# Patient Record
Sex: FEMALE | Race: WHITE | Employment: UNEMPLOYED | URBAN - METROPOLITAN AREA
[De-identification: names, ages, dates, MRNs, and addresses within clinical notes are randomized per-mention and may not be internally consistent; named-entity substitution may affect disease eponyms.]

---

## 2017-05-23 ENCOUNTER — OFFICE VISIT (OUTPATIENT)
Dept: FAMILY MEDICINE CLINIC | Age: 22
End: 2017-05-23

## 2017-05-23 VITALS
DIASTOLIC BLOOD PRESSURE: 79 MMHG | HEIGHT: 66 IN | BODY MASS INDEX: 17.52 KG/M2 | WEIGHT: 109 LBS | TEMPERATURE: 97.5 F | OXYGEN SATURATION: 96 % | SYSTOLIC BLOOD PRESSURE: 110 MMHG | RESPIRATION RATE: 18 BRPM | HEART RATE: 82 BPM

## 2017-05-23 DIAGNOSIS — Z01.419 WELL WOMAN EXAM: ICD-10-CM

## 2017-05-23 DIAGNOSIS — Z00.00 WELL WOMAN EXAM (NO GYNECOLOGICAL EXAM): Primary | ICD-10-CM

## 2017-05-23 PROBLEM — N92.0 MENORRHAGIA: Status: ACTIVE | Noted: 2017-05-23

## 2017-05-23 LAB
BILIRUB UR QL STRIP: NEGATIVE
GLUCOSE UR-MCNC: NEGATIVE MG/DL
HGB BLD-MCNC: 13.1 G/DL
KETONES P FAST UR STRIP-MCNC: NEGATIVE MG/DL
PH UR STRIP: 5.5 [PH] (ref 4.6–8)
PROT UR QL STRIP: NEGATIVE MG/DL
SP GR UR STRIP: 1.02 (ref 1–1.03)
UA UROBILINOGEN AMB POC: NORMAL (ref 0.2–1)
URINALYSIS CLARITY POC: NORMAL
URINALYSIS COLOR POC: YELLOW
URINE BLOOD POC: NEGATIVE
URINE LEUKOCYTES POC: NORMAL
URINE NITRITES POC: NEGATIVE

## 2017-05-23 RX ORDER — NORETHINDRONE ACETATE AND ETHINYL ESTRADIOL 1.5-30(21)
1 KIT ORAL DAILY
COMMUNITY
End: 2020-02-21 | Stop reason: ALTCHOICE

## 2017-05-23 NOTE — MR AVS SNAPSHOT
Visit Information Date & Time Provider Department Dept. Phone Encounter #  
 5/23/2017  9:20 AM Kamaljit Lamar, Vimal Betancur [phone] [id number] Follow-up Instructions Return in about 1 year (around 5/23/2018). Follow-up and Disposition History Upcoming Health Maintenance Date Due  
 HPV AGE 9Y-34Y (1 of 3 - Female 3 Dose Series) 1/9/2007 DTaP/Tdap/Td series (1 - Tdap) 1/9/2017 PAP AKA CERVICAL CYTOLOGY 1/9/2017 INFLUENZA AGE 9 TO ADULT 8/1/2017 Allergies as of 5/23/2017  Review Complete On: 5/23/2017 By: Kamaljit Lamar MD  
 No Known Allergies Current Immunizations  Never Reviewed Name Date DTaP 8/10/2001, 7/22/1997, 1996, 1996, 1996 Hep A Vaccine 7/16/2009, 7/23/2008 Hep B Vaccine 1996, 1996, 1996 Hib 5/2/1997, 1996, 1996, 1996 MMR 8/10/2001, 5/2/1997 Meningococcal Vaccine 7/16/2012 Poliovirus vaccine 8/10/2001, 7/22/1997, 1996, 1996 Varicella Virus Vaccine 7/23/2008, 1/10/1997 Not reviewed this visit You Were Diagnosed With   
  
 Codes Comments Well woman exam (no gynecological exam)    -  Primary ICD-10-CM: Z00.00 ICD-9-CM: V70.0 Well woman exam     ICD-10-CM: Y02.524 ICD-9-CM: V72.31 Vitals BP Pulse Temp Resp Height(growth percentile) Weight(growth percentile) 110/79 (BP 1 Location: Right arm, BP Patient Position: Sitting) 82 97.5 °F (36.4 °C) (Oral) 18 5' 5.5\" (1.664 m) 109 lb (49.4 kg) LMP SpO2 BMI OB Status Smoking Status 05/15/2017 96% 17.86 kg/m2 Having regular periods Never Smoker BMI and BSA Data Body Mass Index Body Surface Area  
 17.86 kg/m 2 1.51 m 2 Preferred Pharmacy Pharmacy Name Phone [id number] [place], [address] AT [facility] [phone] Your Updated Medication List  
  
   
 This list is accurate as of: 5/23/17  4:41 PM.  Always use your most recent med list.  
  
  
  
  
 Gardenia Rosenthal FE 1.5/30 (28) 1.5 mg-30 mcg (21)/75 mg (7) Tab Generic drug:  norethindrone-ethinyl estradiol-iron Take 1 Tab by mouth daily. We Performed the Following AMB POC HEMOGLOBIN (HGB) [72899 CPT(R)] AMB POC URINALYSIS DIP STICK AUTO W/O MICRO [57341 CPT(R)] Follow-up Instructions Return in about 1 year (around 5/23/2018). Introducing [facility] SERVICES! Alka Anali introduces Promodity patient portal. Now you can access parts of your medical record, email your doctor's office, and request medication refills online. 1. In your internet browser, go to [email or web address] 2. Click on the First Time User? Click Here link in the Sign In box. You will see the New Member Sign Up page. 3. Enter your Promodity Access Code exactly as it appears below. You will not need to use this code after youve completed the sign-up process. If you do not sign up before the expiration date, you must request a new code. · Promodity Access Code: [email or web address] Expires: 8/21/2017  4:41 PM 
 
4. Enter the last four digits of your Social Security Number (xxxx) and Date of Birth (mm/dd/yyyy) as indicated and click Submit. You will be taken to the next sign-up page. 5. Create a Weditt ID. This will be your Promodity login ID and cannot be changed, so think of one that is secure and easy to remember. 6. Create a Promodity password. You can change your password at any time. 7. Enter your Password Reset Question and Answer. This can be used at a later time if you forget your password. 8. Enter your e-mail address. You will receive e-mail notification when new information is available in [address] 9. Click Sign Up. You can now view and download portions of your medical record. 10. Click the Download Summary menu link to download a portable copy of your medical information. If you have questions, please visit the Frequently Asked Questions section of the [employer]t website. Remember, Indeed is NOT to be used for urgent needs. For medical emergencies, dial 911. Now available from your iPhone and Android! Please provide this summary of care documentation to your next provider. Your primary care clinician is listed as Darshana Westbrook. If you have any questions after today's visit, please call [phone].

## 2017-05-23 NOTE — PROGRESS NOTES
Going to graduate school -- physical therapy    Ran cross country   Had hip and knee pain over the years    Form completed for graduate school (scanned into media)

## 2017-05-23 NOTE — PROGRESS NOTES
Chief Complaint   Patient presents with    Complete Physical     for college     1. Have you been to the ER, urgent care clinic since your last visit? Hospitalized since your last visit? No    2. Have you seen or consulted any other health care providers outside of the [address] [place] [address] since your last visit? Include any pap smears or colon screening.  No

## 2017-07-25 ENCOUNTER — CLINICAL SUPPORT (OUTPATIENT)
Dept: FAMILY MEDICINE CLINIC | Age: 22
End: 2017-07-25

## 2017-07-25 DIAGNOSIS — Z11.1 SCREENING-PULMONARY TB: Primary | ICD-10-CM

## 2017-07-25 LAB
MM INDURATION POC: 0 MM (ref 0–5)
PPD POC: NEGATIVE NEGATIVE

## 2017-07-25 NOTE — PROGRESS NOTES
Chief Complaint   Patient presents with    PPD Placement     Per Verbal Order From Dr. Felisha Galvin MD, placement of ppd was given on left forearm . Pt has been notified to return in 48-72 hours for TB to be read.

## 2017-07-27 ENCOUNTER — CLINICAL SUPPORT (OUTPATIENT)
Dept: FAMILY MEDICINE CLINIC | Age: 22
End: 2017-07-27

## 2017-07-27 DIAGNOSIS — Z11.1 ENCOUNTER FOR PPD SKIN TEST READING: Primary | ICD-10-CM

## 2017-08-10 ENCOUNTER — CLINICAL SUPPORT (OUTPATIENT)
Dept: FAMILY MEDICINE CLINIC | Age: 22
End: 2017-08-10

## 2017-08-10 DIAGNOSIS — Z23 ENCOUNTER FOR IMMUNIZATION: Primary | ICD-10-CM

## 2018-12-26 ENCOUNTER — OFFICE VISIT (OUTPATIENT)
Dept: FAMILY MEDICINE CLINIC | Age: 23
End: 2018-12-26

## 2018-12-26 VITALS
BODY MASS INDEX: 17.84 KG/M2 | DIASTOLIC BLOOD PRESSURE: 71 MMHG | TEMPERATURE: 97.6 F | OXYGEN SATURATION: 99 % | RESPIRATION RATE: 16 BRPM | HEIGHT: 66 IN | SYSTOLIC BLOOD PRESSURE: 106 MMHG | WEIGHT: 111 LBS | HEART RATE: 100 BPM

## 2018-12-26 DIAGNOSIS — F41.9 ANXIETY AND DEPRESSION: Primary | ICD-10-CM

## 2018-12-26 DIAGNOSIS — F32.A ANXIETY AND DEPRESSION: Primary | ICD-10-CM

## 2018-12-26 RX ORDER — ESCITALOPRAM OXALATE 10 MG/1
10 TABLET ORAL DAILY
Qty: 30 TAB | Refills: 0 | Status: SHIPPED | OUTPATIENT
Start: 2018-12-26 | End: 2019-01-04

## 2018-12-26 NOTE — PROGRESS NOTES
Subjective:      Elayne Bernstein is a 26 y.o. female who presents for initial evaluation of depression and anxiety. She is a new patient to me. She was encouraged to come in by a close relative. Reports that she feels sad most of the time and worries about different things. .   She also starts to cry for no particular reason. Also has little interest and pleasure in doing most things. Has trouble sleeping - usually gets 4- 5 hours a daily She does have a poor appetite and feels bad about herself and that she had let her family down. No trouble concentrating on school, did well this semester. Denies any suicidal, homicidal ideations or substance abuse. Also complains of worrying too much about different things and trouble relaxing. Currently in graduate school - 4 hours away in [address] [place] [address]. Diet - does try and eat healthy. Exercise - runs or goes to the gym - 5 times a week   Sleep - poor trouble falling asleep.      Has appointment with School Therapist in 2 weeks once school opens - 01/07/2019        PHQ over the last two weeks 12/26/2018   Little interest or pleasure in doing things Several days   Feeling down, depressed, irritable, or hopeless More than half the days   Total Score PHQ 2 3   Trouble falling or staying asleep, or sleeping too much Nearly every day   Feeling tired or having little energy More than half the days   Poor appetite, weight loss, or overeating Several days   Feeling bad about yourself - or that you are a failure or have let yourself or your family down More than half the days   Trouble concentrating on things such as school, work, reading, or watching TV Not at all   Moving or speaking so slowly that other people could have noticed; or the opposite being so fidgety that others notice Not at all   Thoughts of being better off dead, or hurting yourself in some way Not at all   PHQ 9 Score 11   How difficult have these problems made it for you to do your work, take care of your home and get along with others Somewhat difficult       No flowsheet data found. Review of Systems   Constitutional: Negative for chills and fever. HENT: Negative for ear pain and sore throat. Eyes: Negative for blurred vision and double vision. Respiratory: Negative for cough and shortness of breath. Cardiovascular: Negative for chest pain and palpitations. Gastrointestinal: Negative for nausea and vomiting. Genitourinary: Negative for dysuria and urgency. Skin: Negative for itching and rash. Neurological: Positive for tingling. Psychiatric/Behavioral: Positive for depression. Negative for hallucinations, substance abuse and suicidal ideas. The patient is nervous/anxious and has insomnia. PMHx:  History reviewed. No pertinent past medical history. Meds:   Current Outpatient Medications   Medication Sig Dispense Refill    escitalopram oxalate (LEXAPRO) 10 mg tablet Take 1 Tab by mouth daily. 30 Tab 0    norethindrone-ethinyl estradiol-iron (JUNEL FE 1.5/30, 28,) 1.5 mg-30 mcg (21)/75 mg (7) tab Take 1 Tab by mouth daily. Allergies:   No Known Allergies    Smoker:  Social History     Tobacco Use   Smoking Status Never Smoker   Smokeless Tobacco Never Used       ETOH:   Social History     Substance and Sexual Activity   Alcohol Use Yes    Alcohol/week: 0.0 oz    Comment: occasionally     Social drinker   No smoking   FH: History reviewed. No pertinent family history. Objective:     Visit Vitals  /71   Pulse 100   Temp 97.6 °F (36.4 °C) (Oral)   Resp 16   Ht 5' 5.5\" (1.664 m)   Wt 111 lb (50.3 kg)   LMP 12/05/2018   SpO2 99%   BMI 18.19 kg/m²       GEN: No apparent distress. LUNGS: Respirations unlabored; clear to auscultation bilaterally  CARDIOVASCULAR: Regular, rate, and rhythm without murmurs, gallops or rubs   SKIN: No obvious rashes. NEUROLOGIC:  No focal neurologic deficits.  Strength and sensation grossly intact. Coordination and gait grossly intact. PSYCH: alert, oriented to person, place, and time, normal mood, behavior, speech, dress, motor activity, and thought processes, anxious    Assessment:       ICD-10-CM ICD-9-CM    1. Anxiety and depression F41.9 300.00     F32.9 311        ALENA 7 - 12  PHQ9-11      Plan:     Anxiety/ Depression:  - Prescribed Lexapro 10 mg , advised that it will take up to 4-6 weeks to notice significant change. Discussed side effects of medication   - Will have patient return to the clinic in 1 week  - Follow up with Psychology for cognitive behavioral therapy   - Instructed patient to contact office or on-call physician promptly should condition worsen or any new symptoms appear and provided on-call telephone numbers. IF THE PATIENT HAS ANY SUICIDAL OR HOMICIDAL IDEATION, CALL THE OFFICE, DISCUSS WITH A SUPPORT MEMBER OR GO TO THE ER IMMEDIATELY. Patient was agreeable with this plan    Patient is counseled to return to the office if symptoms do not improve as expected. Urgent consultation with the nearest Emergency Department is strongly recommended if condition worsens. Patient is counseled to follow up as recommended and to inform the office if any changes in treatment are recommended.             Signed By:  Everett Bragg MD    Family Medicine Resident

## 2018-12-26 NOTE — PATIENT INSTRUCTIONS
Learning About Anxiety Disorders  What are anxiety disorders? Anxiety disorders are a type of medical problem. They cause severe anxiety. When you feel anxious, you feel that something bad is about to happen. This feeling interferes with your life. These disorders include:  · Generalized anxiety disorder. You feel worried and stressed about many everyday events and activities. This goes on for several months and disrupts your life on most days. · Panic disorder. You have repeated panic attacks. A panic attack is a sudden, intense fear or anxiety. It may make you feel short of breath. Your heart may pound. · Social anxiety disorder. You feel very anxious about what you will say or do in front of people. For example, you may be scared to talk or eat in public. This problem affects your daily life. · Phobias. You are very scared of a specific object, situation, or activity. For example, you may fear spiders, high places, or small spaces. What are the symptoms? Generalized anxiety disorder  Symptoms may include:  · Feeling worried and stressed about many things almost every day. · Feeling tired or irritable. You may have a hard time concentrating. · Having headaches or muscle aches. · Having a hard time getting to sleep or staying asleep. Panic disorder  You may have repeated panic attacks when there is no reason for feeling afraid. You may change your daily activities because you worry that you will have another attack. Symptoms may include:  · Intense fear, terror, or anxiety. · Trouble breathing or very fast breathing. · Chest pain or tightness. · A heartbeat that races or is not regular. Social anxiety disorder  Symptoms may include:  · Fear about a social situation, such as eating in front of others or speaking in public. You may worry a lot. Or you may be afraid that something bad will happen. · Anxiety that can cause you to blush, sweat, and feel shaky.   · A heartbeat that is faster than normal.  · A hard time focusing. Phobias  Symptoms may include:  · More fear than most people of being around an object, being in a situation, or doing an activity. You might also be stressed about the chance of being around the thing you fear. · Worry about losing control, panicking, fainting, or having physical symptoms like a faster heartbeat when you are around the situation or object. How are these disorders treated? Anxiety disorders can be treated with medicines or counseling. A combination of both may be used. Medicines may include:  · Antidepressants. These may help your symptoms by keeping chemicals in your brain in balance. · Benzodiazepines. These may give you short-term relief of your symptoms. Some people use cognitive-behavioral therapy. A therapist helps you learn to change stressful or bad thoughts into helpful thoughts. Lead a healthy lifestyle  A healthy lifestyle may help you feel better. · Get at least 30 minutes of exercise on most days of the week. Walking is a good choice. · Eat a healthy diet. Include fruits, vegetables, lean proteins, and whole grains in your diet each day. · Try to go to bed at the same time every night. Try for 8 hours of sleep a night. · Find ways to manage stress. Try relaxation exercises. · Avoid alcohol and illegal drugs. Follow-up care is a key part of your treatment and safety. Be sure to make and go to all appointments, and call your doctor if you are having problems. It's also a good idea to know your test results and keep a list of the medicines you take. Where can you learn more? Go to http://brooke-alli.info/. Enter N775 in the search box to learn more about \"Learning About Anxiety Disorders. \"  Current as of: December 7, 2017  Content Version: 11.8  © 4818-5899 [employer]. Care instructions adapted under license by [employer] (which disclaims liability or warranty for this information).  If you have questions about a medical condition or this instruction, always ask your healthcare professional. [address] any warranty or liability for your use of this information. Recovering From Depression: Care Instructions  Your Care Instructions    Taking good care of yourself is important as you recover from depression. In time, your symptoms will fade as your treatment takes hold. Do not give up. Instead, focus your energy on getting better. Your mood will improve. It just takes some time. Focus on things that can help you feel better, such as being with friends and family, eating well, and getting enough rest. But take things slowly. Do not do too much too soon. You will begin to feel better gradually. Follow-up care is a key part of your treatment and safety. Be sure to make and go to all appointments, and call your doctor if you are having problems. It's also a good idea to know your test results and keep a list of the medicines you take. How can you care for yourself at home? Be realistic  · If you have a large task to do, break it up into smaller steps you can handle, and just do what you can. · You may want to put off important decisions until your depression has lifted. If you have plans that will have a major impact on your life, such as marriage, divorce, or a job change, try to wait a bit. Talk it over with friends and loved ones who can help you look at the overall picture first.  · Reaching out to people for help is important. Do not isolate yourself. Let your family and friends help you. Find someone you can trust and confide in, and talk to that person. · Be patient, and be kind to yourself. Remember that depression is not your fault and is not something you can overcome with willpower alone. Treatment is necessary for depression, just like for any other illness. Feeling better takes time, and your mood will improve little by little.   Stay active  · Stay busy and get outside. Take a walk, or try some other light exercise. · Talk with your doctor about an exercise program. Exercise can help with mild depression. · Go to a movie or concert. Take part in a Hindu activity or other social gathering. Go to a ball game. · Ask a friend to have dinner with you. Take care of yourself  · Eat a balanced diet with plenty of fresh fruits and vegetables, whole grains, and lean protein. If you have lost your appetite, eat small snacks rather than large meals. · Avoid drinking alcohol or using illegal drugs. Do not take medicines that have not been prescribed for you. They may interfere with medicines you may be taking for depression, or they may make your depression worse. · Take your medicines exactly as they are prescribed. You may start to feel better within 1 to 3 weeks of taking antidepressant medicine. But it can take as many as 6 to 8 weeks to see more improvement. If you have questions or concerns about your medicines, or if you do not notice any improvement by 3 weeks, talk to your doctor. · If you have any side effects from your medicine, tell your doctor. Antidepressants can make you feel tired, dizzy, or nervous. Some people have dry mouth, constipation, headaches, sexual problems, or diarrhea. Many of these side effects are mild and will go away on their own after you have been taking the medicine for a few weeks. Some may last longer. Talk to your doctor if side effects are bothering you too much. You might be able to try a different medicine. · Get enough sleep. If you have problems sleeping:  ? Go to bed at the same time every night, and get up at the same time every morning. ? Keep your bedroom dark and quiet. ? Do not exercise after 5:00 p.m.  ? Avoid drinks with caffeine after 5:00 p.m. · Avoid sleeping pills unless they are prescribed by the doctor treating your depression.  Sleeping pills may make you groggy during the day, and they may interact with other medicine you are taking. · If you have any other illnesses, such as diabetes, heart disease, or high blood pressure, make sure to continue with your treatment. Tell your doctor about all of the medicines you take, including those with or without a prescription. · Keep the numbers for these national suicide hotlines: 9-667-043-TALK (7-975.259.8346) and 5-516-JWYDLUA (2-834.336.6434). If you or someone you know talks about suicide or feeling hopeless, get help right away. When should you call for help? Call 911 anytime you think you may need emergency care. For example, call if:    · You feel like hurting yourself or someone else.     · Someone you know has depression and is about to attempt or is attempting suicide.   [facility] your doctor now or seek immediate medical care if:    · You hear voices.     · Someone you know has depression and:  ? Starts to give away his or her possessions. ? Uses illegal drugs or drinks alcohol heavily. ? Talks or writes about death, including writing suicide notes or talking about guns, knives, or pills. ? Starts to spend a lot of time alone. ? Acts very aggressively or suddenly appears calm.    Watch closely for changes in your health, and be sure to contact your doctor if:    · You do not get better as expected. Where can you learn more? Go to http://brooke-alli.info/. Enter O002 in the search box to learn more about \"Recovering From Depression: Care Instructions. \"  Current as of: December 7, 2017  Content Version: 11.8  © 7270-1655 [employer]. Care instructions adapted under license by [employer] (which disclaims liability or warranty for this information). If you have questions about a medical condition or this instruction, always ask your healthcare professional. [address] any warranty or liability for your use of this information.

## 2018-12-27 NOTE — PROGRESS NOTES
[address] Medicine Residency Attending Addendum:  Patient encounter was discussed on the day of the encounter with Pavel Hooker MD, performing the key elements of the service. I discussed the findings, assessment and plan with the resident and agree with the resident's findings and plan as documented in the resident's note.       Kentrell Mcmullen MD, CAQSM, RMSK

## 2019-01-03 NOTE — PROGRESS NOTES
Subjective:      Lola Moore is a 26 y.o. female who presents for follow up of depression and anxiety. She is currently on Lexapro 10 mg daily. Has been on the medication for two weeks Reports slight improvement in mood with the medication. Kiersten Arevalo Has had restless legs once she started medication, also has had some nausea. She would like to switch to different medication   Denies any suicidal ideations, homicidal ideation or substance abuse. Diet - does try and eat healthy   Exercise - Does run 4 times a week   Sleep - has been sleeping 6.5 hours daily. Has follow up with counselor when she goes back to school. ALENA 7 - 13          PHQ over the last two weeks 1/4/2019   Little interest or pleasure in doing things More than half the days   Feeling down, depressed, irritable, or hopeless More than half the days   Total Score PHQ 2 4   Trouble falling or staying asleep, or sleeping too much Nearly every day   Feeling tired or having little energy Nearly every day   Poor appetite, weight loss, or overeating Several days   Feeling bad about yourself - or that you are a failure or have let yourself or your family down Not at all   Trouble concentrating on things such as school, work, reading, or watching TV Several days   Moving or speaking so slowly that other people could have noticed; or the opposite being so fidgety that others notice Nearly every day   Thoughts of being better off dead, or hurting yourself in some way Not at all   PHQ 9 Score 15   How difficult have these problems made it for you to do your work, take care of your home and get along with others -         ROS:  General/Constitutional:   No headache, fever,   Cardiac:    No chest pain      Respiratory:   No cough or shortness of breath     GI:   +nausea. No vomiting, diarrhea, abdominal pain,   :   No dysuria or  hematuria    Neurological:  + restless legs.   No loss of consciousness, dizziness, seizures  PMHx:  No past medical history on file.    Meds:   Current Outpatient Medications   Medication Sig Dispense Refill    citalopram (CELEXA) 20 mg tablet Take 1 Tab by mouth daily. 30 Tab 0    norethindrone-ethinyl estradiol-iron (JUNEL FE 1.5/30, 28,) 1.5 mg-30 mcg (21)/75 mg (7) tab Take 1 Tab by mouth daily. Allergies:   No Known Allergies    Smoker:  Social History     Tobacco Use   Smoking Status Never Smoker   Smokeless Tobacco Never Used       ETOH:   Social History     Substance and Sexual Activity   Alcohol Use Yes    Alcohol/week: 0.0 oz    Comment: occasionally       FH: No family history on file. Objective:     Visit Vitals  /82   Pulse 81   Temp 98.3 °F (36.8 °C) (Oral)   Resp 16   Ht 5' 5.5\" (1.664 m)   Wt 110 lb (49.9 kg)   LMP 12/05/2018   SpO2 99%   BMI 18.03 kg/m²       GEN: No apparent distress. LUNGS: Respirations unlabored; clear to auscultation bilaterally  CARDIOVASCULAR: Regular, rate, and rhythm without murmurs, gallops or rubs   PSYCH: alert, oriented to person, place, and time, normal mood, behavior, speech, dress, motor activity, and thought processes    Assessment:       ICD-10-CM ICD-9-CM    1. Anxiety and depression F41.9 300.00     F32.9 311        Plan:     Anxiety/ Depression:   - Start Celexa 20 mg daily, discontinue Lexapro due to side effects  - Will have patient return to the clinic in 2 weeks  - Follow up with counselor as scheduled  - Instructed patient to contact office or on-call physician promptly should condition worsen or any new symptoms appear and provided on-call telephone numbers. IF THE PATIENT HAS ANY SUICIDAL OR HOMICIDAL IDEATION, CALL THE OFFICE, DISCUSS WITH A SUPPORT MEMBER OR GO TO THE ER IMMEDIATELY. Patient was agreeable with this plan    Patient is counseled to return to the office if symptoms do not improve as expected. Urgent consultation with the nearest Emergency Department is strongly recommended if condition worsens.   Patient is counseled to follow up as recommended and to inform the office if any changes in treatment are recommended.           Signed By:  Catherine Smith MD    Family Medicine Resident

## 2019-01-04 ENCOUNTER — OFFICE VISIT (OUTPATIENT)
Dept: FAMILY MEDICINE CLINIC | Age: 24
End: 2019-01-04

## 2019-01-04 VITALS
DIASTOLIC BLOOD PRESSURE: 82 MMHG | BODY MASS INDEX: 17.68 KG/M2 | HEART RATE: 81 BPM | WEIGHT: 110 LBS | RESPIRATION RATE: 16 BRPM | SYSTOLIC BLOOD PRESSURE: 116 MMHG | OXYGEN SATURATION: 99 % | HEIGHT: 66 IN | TEMPERATURE: 98.3 F

## 2019-01-04 DIAGNOSIS — F32.A ANXIETY AND DEPRESSION: Primary | ICD-10-CM

## 2019-01-04 DIAGNOSIS — F41.9 ANXIETY AND DEPRESSION: Primary | ICD-10-CM

## 2019-01-04 RX ORDER — CITALOPRAM 20 MG/1
20 TABLET, FILM COATED ORAL DAILY
Qty: 30 TAB | Refills: 0 | Status: SHIPPED | OUTPATIENT
Start: 2019-01-04 | End: 2019-02-13 | Stop reason: SDUPTHER

## 2019-01-04 NOTE — PROGRESS NOTES
[address] Medicine Residency Attending Addendum:  Patient encounter was discussed on the day of the encounter with Meghann Plummer MD, performing the key elements of the service. I discussed the findings, assessment and plan with the resident and agree with the resident's findings and plan as documented in the resident's note.       Ashley Uribe MD, CAQSM, RMSK

## 2019-02-13 NOTE — TELEPHONE ENCOUNTER
----- Message from [employer] sent at 2/12/2019  4:43 PM EST -----  Regarding: DR Wilman Hu / REFILL   Pt is requesting Celexa 20 mg # 30 to be called into [facility] on [address] [phone]. Due to delay of appointment for [facility] scheduled  2/25/19.    Best Contact: [phone]

## 2019-02-14 RX ORDER — CITALOPRAM 20 MG/1
20 TABLET, FILM COATED ORAL DAILY
Qty: 30 TAB | Refills: 0 | Status: SHIPPED | OUTPATIENT
Start: 2019-02-14 | End: 2020-04-13

## 2020-02-21 ENCOUNTER — OFFICE VISIT (OUTPATIENT)
Dept: FAMILY MEDICINE CLINIC | Age: 25
End: 2020-02-21

## 2020-02-21 VITALS
OXYGEN SATURATION: 98 % | TEMPERATURE: 96.3 F | BODY MASS INDEX: 17.52 KG/M2 | DIASTOLIC BLOOD PRESSURE: 72 MMHG | HEIGHT: 66 IN | WEIGHT: 109 LBS | HEART RATE: 73 BPM | SYSTOLIC BLOOD PRESSURE: 107 MMHG | RESPIRATION RATE: 16 BRPM

## 2020-02-21 DIAGNOSIS — J02.9 SORE THROAT: Primary | ICD-10-CM

## 2020-02-21 LAB
S PYO AG THROAT QL: NEGATIVE
VALID INTERNAL CONTROL?: YES

## 2020-02-21 RX ORDER — DROSPIRENONE AND ETHINYL ESTRADIOL 0.02-3(28)
KIT ORAL DAILY
Status: ON HOLD | COMMUNITY
End: 2020-09-10

## 2020-02-21 NOTE — PROGRESS NOTES
Chief Complaint   Patient presents with    Sore Throat     times one week    Fever     1. Have you been to the ER, urgent care clinic since your last visit? Hospitalized since your last visit? No    2. Have you seen or consulted any other health care providers outside of the [address] [place] [address] since your last visit? Include any pap smears or colon screening.  No

## 2020-02-21 NOTE — PROGRESS NOTES
Demario Rodriguez is a 26 y.o. female who had concerns including Sore Throat (times one week) and Fever. HPI:    Has been feeling ill for the last 7 days. Having low grade fever, which has been treated with Tylenol and ibuprofen. Sore throat. Cough that is nonproductive. Fatigue. No rhinorrhea, no nasal congestions. No ear pain or trouble hearing. Has been taking Nyquil. Was feeling better this morning, and tried to go to work, but they sent her home. She needs a note saying that she can go back to work. Has sick contacts at work. Did get flu shot this year. ROS: (positive in bold)  General: wt loss, fever, chills  HEENT: changes in vision, sore throat, rhinorrhea  Cardiac: chest pain, palpitations  Pul: SOB, dyspnea, wheezing, cough  GI: abdominal pain, nausea, vomiting, diarrhea, constipation   : dysuria, freq, urgency  MSK: joint pain, swelling, myalgia, back pain  Neuro: weakness, parasthesias, headache  Psych: anxiety, depression  Skin: rashes or suspicious skin lesions    Past Medical History:  No past medical history on file. Past Surgical History:  Past Surgical History:   Procedure Laterality Date    HX TONSIL AND ADENOIDECTOMY         Family History:  No family history on file. Allergies:  No Known Allergies    Social History:  Social History     Tobacco Use    Smoking status: Never Smoker    Smokeless tobacco: Never Used   Substance Use Topics    Alcohol use: Yes     Alcohol/week: 0.0 standard drinks     Comment: occasionally    Drug use: No       Current Meds:  Current Outpatient Medications on File Prior to Visit   Medication Sig Dispense Refill    drospirenone-ethinyl estradioL (BARRY, 28,) 3-0.02 mg tab Take  by mouth daily.  citalopram (CELEXA) 20 mg tablet Take 1 Tab by mouth daily. 30 Tab 0    [DISCONTINUED] norethindrone-ethinyl estradiol-iron (JUNEL FE 1.5/30, 28,) 1.5 mg-30 mcg (21)/75 mg (7) tab Take 1 Tab by mouth daily.        No current facility-administered medications on file prior to visit. Physical Exam:  Visit Vitals  /72   Pulse 73   Temp 96.3 °F (35.7 °C) (Oral)   Resp 16   Ht 5' 5.5\" (1.664 m)   Wt 109 lb (49.4 kg)   SpO2 98%   BMI 17.86 kg/m²       Gen:  Well developed, well nourished female in no acute distress  HEENT: normocephalic/atraumatic; TM intact, translucent, and neutral BL; nasal turbinates not swollen, no visible discharge; oropharynx shows some erythema, no exudates  Neck:   Supple, no lympadenopathy  Card:  RRR, no murmurs  Chest:  CTAB posteriorly  Extr:  2+ pulses BL  MSK:  no joint pain or swelling  Neuro: CN II-XII grossly intact  Psych:  normal mood and affect   Skin:  No rashes or suspicious skin lesions noted    Strep: negative    Assessment/Plan:    1. Sore throat: swab for strep was negative. She needs a note saying that she can return to work. Treat conservatively. She is feeling better.  -strep negative  -continue with Tylenol and ibuprofen as needed  -continue with conservative management  -given note saying that she can return on Monday  -advised to RTC if not improved      I have discussed the diagnosis with the patient and the intended plan as seen in the above orders. The patient has received an after-visit summary and questions were answered concerning future plans. I have discussed medication side effects and warnings with the patient as well. The patient agrees and understands above plan. Follow-up and Dispositions    · Return if symptoms worsen or fail to improve. Patient discussed with supervising attending.     Kristina Sky DO  Primary Care/Sports Medicine Fellow

## 2020-02-21 NOTE — PATIENT INSTRUCTIONS

## 2020-02-21 NOTE — LETTER
NOTIFICATION RETURN TO WORK / SCHOOL 
 
2/21/2020 11:28 AM 
 
Ms. Ammy Lr 
[address] [facility] [id number] To Whom It May Concern: 
 
Ammy Lr is currently under the care of [facility]. She was seen in the clinic on 2/21/2020. She will return to work on: 2/24/2020. If there are questions or concerns please have the patient contact our office. Sincerely, 
 
 
Carmel Tucker

## 2020-04-13 ENCOUNTER — VIRTUAL VISIT (OUTPATIENT)
Dept: FAMILY MEDICINE CLINIC | Age: 25
End: 2020-04-13

## 2020-04-13 DIAGNOSIS — G47.00 INSOMNIA, UNSPECIFIED TYPE: ICD-10-CM

## 2020-04-13 DIAGNOSIS — F32.A ANXIETY AND DEPRESSION: Primary | ICD-10-CM

## 2020-04-13 DIAGNOSIS — F41.9 ANXIETY AND DEPRESSION: Primary | ICD-10-CM

## 2020-04-13 RX ORDER — DULOXETIN HYDROCHLORIDE 30 MG/1
30 CAPSULE, DELAYED RELEASE ORAL DAILY
Qty: 30 CAP | Refills: 0 | Status: SHIPPED | OUTPATIENT
Start: 2020-04-13 | End: 2020-04-28 | Stop reason: SDUPTHER

## 2020-04-13 NOTE — PROGRESS NOTES
Elías Ruiz  26 y.o. female  1995  [address] [facility]  [id number]   [address]:    Telemedicine Progress Note  Rachel Lopez MD       Encounter Date and Time: April 13, 2020 at 1:01 PM    Consent:  She and/or the health care decision maker is aware that that she may receive a bill for this telephone service, depending on her insurance coverage, and has provided verbal consent to proceed: Yes    Chief Complaint   Patient presents with    Anxiety    Depression     History of Present Illness   Elías Ruiz is a 26 y.o. female was evaluated by synchronous (real-time) audio-video technology from home, through the [facility] Patient Portal.    Anxiety and Depression  - Lexapro, muscle aches  - Celexa 20mg x1 month, did not feel therapeutic.   - Zoloft - made her feel tired, yawning a lot. Took it for 8 months and then stopped ~ 1 month ago. - Therapist at school. Has not seen a psychiatrist  - Sxs: feels like she's empty and heavily weight down, worrying about everything and stressed out. She has problem with confidence and low self-esteem. - Denies any SI/HI/AVH    PMHx: denies any seizure d/o   Review of Systems   Review of Systems   Respiratory: Negative for cough and shortness of breath. Cardiovascular: Negative for chest pain. Psychiatric/Behavioral: Positive for depression. Negative for hallucinations and suicidal ideas. The patient is nervous/anxious and has insomnia. Vitals/Objective:     General: alert, cooperative, no distress   Mental  status: mental status: alert, oriented to person, place, and time, normal mood, behavior, speech, dress, motor activity, and thought processes   Resp: resp: normal effort and no respiratory distress   Neuro: neuro: no gross deficits   Skin: skin: no discoloration or lesions of concern on visible areas   Psych Normal mood and affect. Normal behavior.  Answers questions appropriately   Due to this being a TeleHealth evaluation, many elements of the physical examination are unable to be assessed. Assessment and Plan:   Time-based coding, delete if not needed: I spent at least 25 minutes with this established patient, and >50% of the time was spent counseling and/or coordinating care regarding :      1. Anxiety and depression  Start Cymbalta daily. Discussed side effects with patient. Continue seeing therapist. Will refer to psychiatry since patient has never been to psych. F/u in 2 weeks. - DULoxetine (CYMBALTA) 30 mg capsule; Take 1 Cap by mouth daily for 30 days. Dispense: 30 Cap; Refill: 0  - REFERRAL TO PSYCHIATRY      2. Insomnia, unspecified type  - melatonin 5mg 1-2 hours before bedtime  - Discussed sleep hygiene      Time spent in direct conversation with the patient to include medical condition(s) discussed, assessment and treatment plan:       We discussed the expected course, resolution and complications of the diagnosis(es) in detail. Medication risks, benefits, costs, interactions, and alternatives were discussed as indicated. I advised her to contact the office if her condition worsens, changes or fails to improve as anticipated. She expressed understanding with the diagnosis(es) and plan. Patient understands that this encounter was a temporary measure, and the importance of further follow up and examination was emphasized. Patient verbalized understanding. Patient informed to follow up:  2 weeks    Electronically Signed: Beatriz Arroyo MD    Providers location when delivering service (clinic, hospital, home): home    CPT Codes 12630-65888 for Established Patients may apply to this Telehealth Visit. POS code: 18.   Modifier GT      Pursuant to the emergency declaration under the [facility] waiver authority and the [employer] Act, this Virtual  Visit was conducted, with patient's consent, to reduce the patient's risk of exposure to COVID-19 and provide continuity of care for an established patient. Services were provided through a video synchronous discussion virtually to substitute for in-person clinic visit. History   Patients past medical, surgical and family histories were reviewed and updated. No past medical history on file. Past Surgical History:   Procedure Laterality Date    HX TONSIL AND ADENOIDECTOMY       No family history on file. Social History     Socioeconomic History    Marital status: SINGLE     Spouse name: Not on file    Number of children: Not on file    Years of education: Not on file    Highest education level: Not on file   Occupational History    Occupation: college student   Social Needs    Financial resource strain: Not on file    Food insecurity     Worry: Not on file     Inability: Not on file   [employer] needs     Medical: Not on file     Non-medical: Not on file   Tobacco Use    Smoking status: Never Smoker    Smokeless tobacco: Never Used   Substance and Sexual Activity    Alcohol use:  Yes     Alcohol/week: 0.0 standard drinks     Comment: occasionally    Drug use: No    Sexual activity: Yes     Partners: Male     Birth control/protection: Pill   Lifestyle    Physical activity     Days per week: Not on file     Minutes per session: Not on file    Stress: Not on file   Relationships    Social connections     Talks on phone: Not on file     Gets together: Not on file     Attends Anabaptist service: Not on file     Active member of club or organization: Not on file     Attends meetings of clubs or organizations: Not on file     Relationship status: Not on file    Intimate partner violence     Fear of current or ex partner: Not on file     Emotionally abused: Not on file     Physically abused: Not on file     Forced sexual activity: Not on file   Other Topics Concern    Not on file   Social History Narrative    Not on file     Patient Active Problem List   Diagnosis Code    Menorrhagia N92.0          Current Medications/Allergies   Medications and Allergies reviewed:    Current Outpatient Medications   Medication Sig Dispense Refill    DULoxetine (CYMBALTA) 30 mg capsule Take 1 Cap by mouth daily for 30 days. 30 Cap 0    drospirenone-ethinyl estradioL (BARRY, Fiona,) 3-0.02 mg tab Take  by mouth daily.        No Known Allergies

## 2020-04-13 NOTE — PATIENT INSTRUCTIONS
Learning About Generalized Anxiety Disorder What is generalized anxiety disorder? We all worry. It's a normal part of life. But when you have generalized anxiety disorder, you worry about lots of things and have a hard time stopping your worry. This worry or anxiety interferes with your relationships, work, and life. What causes it? The cause is not known. But it may be passed down through families. What are the symptoms? You may feel anxious or worry most days about things like work, relationships, health, or money. You may worry about things that are unlikely to happen. You find it hard to stop or control the worry. Because you worry a lot and try hard to stop worrying, you may feel restless, tired, tense, or cranky. You may also find it hard to think or sleep. And you may have headaches or an upset stomach. How is it diagnosed? Your doctor will ask about your health and how often you worry or feel anxious. He or she may ask about other symptoms, like whether you: · Feel restless. · Feel tired. · Have a hard time thinking or feel that your mind goes blank. · Feel cranky. · Have tense muscles. · Have sleep problems. A physical exam and tests can help make sure that your symptoms aren't caused by a different condition, such as a thyroid problem. How is it treated? Counseling and medicine can both work to treat anxiety. The two are often used along with lifestyle changes. Cognitive-behavioral therapy (CBT) is a type of counseling that's used to help treat anxiety. In CBT, you learn how to notice and replace thoughts that make you feel worried. It also can help you learn how to relax when you worry. Medicines can help. These medicines are often also used for depression. Selective serotonin reuptake inhibitors (SSRIs) are often tried first. But there are other medicines that your doctor may use. You may need to try a few medicines to find one that works well. Many people feel better by getting regular exercise, eating healthy meals, and getting good sleep. Mindfulnessfocusing on things that happen in the present momentalso can help reduce your anxiety. What can you expect when you have it? Having anxiety can be upsetting. Some people might feel less worried and stressed after a couple of months of treatment. But for other people, it might take longer to feel better. Reaching out to people for help is important. Try not to isolate yourself. Let your family and friends help you. Find someone you can trust and confide in. Talk to that person. When you know what anxiety isand how you can get help for ityou can start to learn new ways of thinking. This can help you cope and work through your anxiety. Follow-up care is a key part of your treatment and safety. Be sure to make and go to all appointments, and call your doctor if you are having problems. It's also a good idea to know your test results and keep a list of the medicines you take. Where can you learn more? Go to http://brooke-alli.info/ Enter G110 in the search box to learn more about \"Learning About Generalized Anxiety Disorder. \" Current as of: May 28, 2019Content Version: 12.4 © 3161-5952 Healthwise, Incorporated. Care instructions adapted under license by [employer] (which disclaims liability or warranty for this information). If you have questions about a medical condition or this instruction, always ask your healthcare professional. [address] any warranty or liability for your use of this information.

## 2020-04-13 NOTE — PROGRESS NOTES
I reviewed with the resident the medical history. I discussed with the resident the patient's diagnosis and concur with the plan.

## 2020-04-28 ENCOUNTER — VIRTUAL VISIT (OUTPATIENT)
Dept: FAMILY MEDICINE CLINIC | Age: 25
End: 2020-04-28

## 2020-04-28 DIAGNOSIS — F41.9 ANXIETY AND DEPRESSION: ICD-10-CM

## 2020-04-28 DIAGNOSIS — F32.A ANXIETY AND DEPRESSION: ICD-10-CM

## 2020-04-28 DIAGNOSIS — G47.00 INSOMNIA, UNSPECIFIED TYPE: Primary | ICD-10-CM

## 2020-04-28 RX ORDER — TRAZODONE HYDROCHLORIDE 50 MG/1
25 TABLET ORAL
Qty: 30 TAB | Refills: 0 | Status: SHIPPED | OUTPATIENT
Start: 2020-04-28 | End: 2020-07-20

## 2020-04-28 RX ORDER — DULOXETIN HYDROCHLORIDE 30 MG/1
30 CAPSULE, DELAYED RELEASE ORAL DAILY
Qty: 30 CAP | Refills: 0 | Status: SHIPPED | OUTPATIENT
Start: 2020-04-28 | End: 2020-05-28

## 2020-04-28 NOTE — PATIENT INSTRUCTIONS
STOP MELATONIN 
START TRAZODONE 
CONTINUE CYMBALTA Insomnia: Care Instructions Your Care Instructions Insomnia is the inability to sleep well. It is a common problem for most people at some time. Insomnia may make it hard for you to get to sleep, stay asleep, or sleep as long as you need to. This can make you tired and grouchy during the day. It can also make you forgetful, less effective at work, and unhappy. Insomnia can be caused by conditions such as depression or anxiety. Pain can also affect your ability to sleep. When these problems are solved, the insomnia usually clears up. But sometimes bad sleep habits can cause insomnia. If insomnia is affecting your work or your enjoyment of life, you can take steps to improve your sleep. Follow-up care is a key part of your treatment and safety. Be sure to make and go to all appointments, and call your doctor if you are having problems. It's also a good idea to know your test results and keep a list of the medicines you take. How can you care for yourself at home? What to avoid · Do not have drinks with caffeine, such as coffee or black tea, for 8 hours before bed. · Do not smoke or use other types of tobacco near bedtime. Nicotine is a stimulant and can keep you awake. · Avoid drinking alcohol late in the evening, because it can cause you to wake in the middle of the night. · Do not eat a big meal close to bedtime. If you are hungry, eat a light snack. · Do not drink a lot of water close to bedtime, because the need to urinate may wake you up during the night. · Do not read or watch TV in bed. Use the bed only for sleeping and sexual activity. What to try · Go to bed at the same time every night, and wake up at the same time every morning. Do not take naps during the day. · Keep your bedroom quiet, dark, and cool. · Sleep on a comfortable pillow and mattress.  
· If watching the clock makes you anxious, turn it facing away from you so you cannot see the time. · If you worry when you lie down, start a worry book. Well before bedtime, write down your worries, and then set the book and your concerns aside. · Try meditation or other relaxation techniques before you go to bed. · If you cannot fall asleep, get up and go to another room until you feel sleepy. Do something relaxing. Repeat your bedtime routine before you go to bed again. · Make your house quiet and calm about an hour before bedtime. Turn down the lights, turn off the TV, log off the computer, and turn down the volume on music. This can help you relax after a busy day. When should you call for help? Watch closely for changes in your health, and be sure to contact your doctor if: 
  · Your efforts to improve your sleep do not work.  
  · Your insomnia gets worse.  
  · You have been feeling down, depressed, or hopeless or have lost interest in things that you usually enjoy. Where can you learn more? Go to http://brooke-alli.info/ Enter P513 in the search box to learn more about \"Insomnia: Care Instructions. \" Current as of: December 15, 2019Content Version: 12.4 © 3520-3598 Healthwise, Incorporated. Care instructions adapted under license by [employer] (which disclaims liability or warranty for this information). If you have questions about a medical condition or this instruction, always ask your healthcare professional. [address] any warranty or liability for your use of this information.

## 2020-04-28 NOTE — PROGRESS NOTES
Wilman Gabriel  26 y.o. female  1995  [address] [facility]  [id number]   [address]:    Telemedicine Progress Note  Roseanna Flores MD       Encounter Date and Time: April 28, 2020 at 11:08 AM    Consent:  She and/or the health care decision maker is aware that that she may receive a bill for this telephone service, depending on her insurance coverage, and has provided verbal consent to proceed: Yes    Chief Complaint   Patient presents with    Anxiety    Depression    Insomnia     History of Present Illness   Wilman Gabriel is a 26 y.o. female was evaluated by synchronous (real-time) audio-video technology from home, through the [facility] Patient Portal.    Anxiety/Depression  - She is doing better since starting Duloxetine 30mg  - Not feeling as low or hopeless as before  - Handling her anxiety better, less over-handling things and less over-worrying like she used to in the past  - No SI/HI/AVH    Insomnia  - She still having sleeping issue . Tried melatonin   - Practicing sleep hygiene: don't use room for anything but sleep, cool room, if not 45mins if not fall asleep- will leave and come back when she is tired. - Has been having trouble staying asleep, sleep for 15-20 mins and then on/off sleep for 3-4 hours. Feels tired. - No history of seizures or cardiac dz      Review of Systems   Review of Systems   Respiratory: Negative for shortness of breath. Cardiovascular: Negative for chest pain and palpitations. Neurological: Negative for seizures. Psychiatric/Behavioral: Negative for depression, hallucinations and suicidal ideas. The patient has insomnia. The patient is not nervous/anxious.         Vitals/Objective:     General: alert, cooperative, no distress   Mental  status: mental status: alert, oriented to person, place, and time, normal mood, behavior, speech, dress, motor activity, and thought processes   Resp: resp: normal effort and no respiratory distress Neuro: neuro: no gross deficits   Skin: skin: no discoloration or lesions of concern on visible areas   Due to this being a TeleHealth evaluation, many elements of the physical examination are unable to be assessed. Assessment and Plan:       1. Anxiety and depression  Improved since starting Cymbalta. Will refill 30 day supply and reassess in 1 month. - DULoxetine (CYMBALTA) 30 mg capsule; Take 1 Cap by mouth daily for 30 days. Dispense: 30 Cap; Refill: 0    2. Insomnia, unspecified type  Not therapeutic. Trial of melatonin but still has issue staying asleep. Start low dose trazodone 25mg qhs. Discussed side effects with patient. - traZODone (DESYREL) 50 mg tablet; Take 0.5 Tabs by mouth nightly. Dispense: 30 Tab; Refill: 0          Time spent in direct conversation with the patient to include medical condition(s) discussed, assessment and treatment plan:       We discussed the expected course, resolution and complications of the diagnosis(es) in detail. Medication risks, benefits, costs, interactions, and alternatives were discussed as indicated. I advised her to contact the office if her condition worsens, changes or fails to improve as anticipated. She expressed understanding with the diagnosis(es) and plan. Patient understands that this encounter was a temporary measure, and the importance of further follow up and examination was emphasized. Patient verbalized understanding.        Patient informed to follow up: patient will call to schedule appt ~1 month    Electronically Signed: Chloe Carrillo MD    Providers location when delivering service (clinic, hospital, home): Home          Pursuant to the emergency declaration under the [place], [facility] waiver authority and the Coronavirus Preparedness and [employer] Act, this Virtual  Visit was conducted, with patient's consent, to reduce the patient's risk of exposure to COVID-19 and provide continuity of care for an established patient. Services were provided through a video synchronous discussion virtually to substitute for in-person clinic visit. History   Patients past medical, surgical and family histories were reviewed and updated. No past medical history on file. Past Surgical History:   Procedure Laterality Date    HX TONSIL AND ADENOIDECTOMY       No family history on file. Social History     Socioeconomic History    Marital status: SINGLE     Spouse name: Not on file    Number of children: Not on file    Years of education: Not on file    Highest education level: Not on file   Occupational History    Occupation: college student   Social Needs    Financial resource strain: Not on file    Food insecurity     Worry: Not on file     Inability: Not on file   [employer] needs     Medical: Not on file     Non-medical: Not on file   Tobacco Use    Smoking status: Never Smoker    Smokeless tobacco: Never Used   Substance and Sexual Activity    Alcohol use:  Yes     Alcohol/week: 0.0 standard drinks     Comment: occasionally    Drug use: No    Sexual activity: Yes     Partners: Male     Birth control/protection: Pill   Lifestyle    Physical activity     Days per week: Not on file     Minutes per session: Not on file    Stress: Not on file   Relationships    Social connections     Talks on phone: Not on file     Gets together: Not on file     Attends Restorationism service: Not on file     Active member of club or organization: Not on file     Attends meetings of clubs or organizations: Not on file     Relationship status: Not on file    Intimate partner violence     Fear of current or ex partner: Not on file     Emotionally abused: Not on file     Physically abused: Not on file     Forced sexual activity: Not on file   Other Topics Concern    Not on file   Social History Narrative    Not on file     Patient Active Problem List   Diagnosis Code    Menorrhagia N92.0 Current Medications/Allergies   Medications and Allergies reviewed:    Current Outpatient Medications   Medication Sig Dispense Refill    traZODone (DESYREL) 50 mg tablet Take 0.5 Tabs by mouth nightly. 30 Tab 0    DULoxetine (CYMBALTA) 30 mg capsule Take 1 Cap by mouth daily for 30 days. 30 Cap 0    drospirenone-ethinyl estradioL (BARRY, Fiona,) 3-0.02 mg tab Take  by mouth daily.        No Known Allergies

## 2020-07-19 DIAGNOSIS — G47.00 INSOMNIA, UNSPECIFIED TYPE: ICD-10-CM

## 2020-07-20 RX ORDER — TRAZODONE HYDROCHLORIDE 50 MG/1
TABLET ORAL
Qty: 30 TAB | Refills: 0 | Status: ON HOLD | OUTPATIENT
Start: 2020-07-20 | End: 2020-09-10

## 2020-09-08 ENCOUNTER — HOSPITAL ENCOUNTER (EMERGENCY)
Age: 25
Discharge: PSYCHIATRIC OR SUBSTANCE USE FACILITY | End: 2020-09-09
Attending: EMERGENCY MEDICINE | Admitting: EMERGENCY MEDICINE
Payer: COMMERCIAL

## 2020-09-08 DIAGNOSIS — R45.851 SUICIDAL IDEATION: Primary | ICD-10-CM

## 2020-09-08 DIAGNOSIS — F32.A DEPRESSION, UNSPECIFIED DEPRESSION TYPE: ICD-10-CM

## 2020-09-08 LAB
ALBUMIN SERPL-MCNC: 5 G/DL (ref 3.5–5)
ALBUMIN/GLOB SERPL: 1.4 {RATIO} (ref 1.1–2.2)
ALP SERPL-CCNC: 51 U/L (ref 45–117)
ALT SERPL-CCNC: 20 U/L (ref 12–78)
AMPHET UR QL SCN: NEGATIVE
ANION GAP SERPL CALC-SCNC: 6 MMOL/L (ref 5–15)
APAP SERPL-MCNC: <2 UG/ML (ref 10–30)
APPEARANCE UR: CLEAR
AST SERPL-CCNC: 18 U/L (ref 15–37)
BARBITURATES UR QL SCN: NEGATIVE
BASOPHILS # BLD: 0 K/UL (ref 0–0.1)
BASOPHILS NFR BLD: 0 % (ref 0–1)
BENZODIAZ UR QL: NEGATIVE
BILIRUB SERPL-MCNC: 0.6 MG/DL (ref 0.2–1)
BILIRUB UR QL: NEGATIVE
BUN SERPL-MCNC: 10 MG/DL (ref 6–20)
BUN/CREAT SERPL: 12 (ref 12–20)
CALCIUM SERPL-MCNC: 9.9 MG/DL (ref 8.5–10.1)
CANNABINOIDS UR QL SCN: POSITIVE
CHLORIDE SERPL-SCNC: 106 MMOL/L (ref 97–108)
CO2 SERPL-SCNC: 27 MMOL/L (ref 21–32)
COCAINE UR QL SCN: NEGATIVE
COLOR UR: ABNORMAL
COMMENT, HOLDF: NORMAL
COVID-19 RAPID TEST, COVR: NOT DETECTED
CREAT SERPL-MCNC: 0.82 MG/DL (ref 0.55–1.02)
DIFFERENTIAL METHOD BLD: ABNORMAL
DRUG SCRN COMMENT,DRGCM: ABNORMAL
EOSINOPHIL # BLD: 0 K/UL (ref 0–0.4)
EOSINOPHIL NFR BLD: 0 % (ref 0–7)
ERYTHROCYTE [DISTWIDTH] IN BLOOD BY AUTOMATED COUNT: 11.5 % (ref 11.5–14.5)
ETHANOL SERPL-MCNC: <10 MG/DL
GLOBULIN SER CALC-MCNC: 3.6 G/DL (ref 2–4)
GLUCOSE SERPL-MCNC: 102 MG/DL (ref 65–100)
GLUCOSE UR STRIP.AUTO-MCNC: NEGATIVE MG/DL
HCT VFR BLD AUTO: 42.5 % (ref 35–47)
HEALTH STATUS, XMCV2T: NORMAL
HGB BLD-MCNC: 14.4 G/DL (ref 11.5–16)
HGB UR QL STRIP: NEGATIVE
IMM GRANULOCYTES # BLD AUTO: 0 K/UL (ref 0–0.04)
IMM GRANULOCYTES NFR BLD AUTO: 0 % (ref 0–0.5)
KETONES UR QL STRIP.AUTO: 40 MG/DL
LEUKOCYTE ESTERASE UR QL STRIP.AUTO: NEGATIVE
LYMPHOCYTES # BLD: 1.5 K/UL (ref 0.8–3.5)
LYMPHOCYTES NFR BLD: 15 % (ref 12–49)
MCH RBC QN AUTO: 32.2 PG (ref 26–34)
MCHC RBC AUTO-ENTMCNC: 33.9 G/DL (ref 30–36.5)
MCV RBC AUTO: 95.1 FL (ref 80–99)
METHADONE UR QL: NEGATIVE
MONOCYTES # BLD: 0.4 K/UL (ref 0–1)
MONOCYTES NFR BLD: 4 % (ref 5–13)
NEUTS SEG # BLD: 8.1 K/UL (ref 1.8–8)
NEUTS SEG NFR BLD: 81 % (ref 32–75)
NITRITE UR QL STRIP.AUTO: NEGATIVE
NRBC # BLD: 0 K/UL (ref 0–0.01)
NRBC BLD-RTO: 0 PER 100 WBC
OPIATES UR QL: NEGATIVE
PCP UR QL: NEGATIVE
PH UR STRIP: 6 [PH] (ref 5–8)
PLATELET # BLD AUTO: 257 K/UL (ref 150–400)
PMV BLD AUTO: 10.1 FL (ref 8.9–12.9)
POTASSIUM SERPL-SCNC: 3.6 MMOL/L (ref 3.5–5.1)
PROT SERPL-MCNC: 8.6 G/DL (ref 6.4–8.2)
PROT UR STRIP-MCNC: NEGATIVE MG/DL
RBC # BLD AUTO: 4.47 M/UL (ref 3.8–5.2)
SALICYLATES SERPL-MCNC: <1.7 MG/DL (ref 2.8–20)
SAMPLES BEING HELD,HOLD: NORMAL
SODIUM SERPL-SCNC: 139 MMOL/L (ref 136–145)
SOURCE, COVRS: NORMAL
SP GR UR REFRACTOMETRY: 1.01 (ref 1–1.03)
SPECIMEN SOURCE, FCOV2M: NORMAL
SPECIMEN TYPE, XMCV1T: NORMAL
UR CULT HOLD, URHOLD: NORMAL
UROBILINOGEN UR QL STRIP.AUTO: 0.2 EU/DL (ref 0.2–1)
WBC # BLD AUTO: 10.1 K/UL (ref 3.6–11)

## 2020-09-08 PROCEDURE — 96374 THER/PROPH/DIAG INJ IV PUSH: CPT

## 2020-09-08 PROCEDURE — 74011250637 HC RX REV CODE- 250/637: Performed by: EMERGENCY MEDICINE

## 2020-09-08 PROCEDURE — 81003 URINALYSIS AUTO W/O SCOPE: CPT

## 2020-09-08 PROCEDURE — 85025 COMPLETE CBC W/AUTO DIFF WBC: CPT

## 2020-09-08 PROCEDURE — 99285 EMERGENCY DEPT VISIT HI MDM: CPT

## 2020-09-08 PROCEDURE — 36415 COLL VENOUS BLD VENIPUNCTURE: CPT

## 2020-09-08 PROCEDURE — 87635 SARS-COV-2 COVID-19 AMP PRB: CPT

## 2020-09-08 PROCEDURE — 80053 COMPREHEN METABOLIC PANEL: CPT

## 2020-09-08 PROCEDURE — 80307 DRUG TEST PRSMV CHEM ANLYZR: CPT

## 2020-09-08 PROCEDURE — 74011250636 HC RX REV CODE- 250/636: Performed by: EMERGENCY MEDICINE

## 2020-09-08 RX ORDER — ONDANSETRON 2 MG/ML
4 INJECTION INTRAMUSCULAR; INTRAVENOUS
Status: COMPLETED | OUTPATIENT
Start: 2020-09-08 | End: 2020-09-08

## 2020-09-08 RX ORDER — ACETAMINOPHEN 500 MG
1000 TABLET ORAL ONCE
Status: COMPLETED | OUTPATIENT
Start: 2020-09-08 | End: 2020-09-08

## 2020-09-08 RX ORDER — LORAZEPAM 0.5 MG/1
0.5 TABLET ORAL
Status: COMPLETED | OUTPATIENT
Start: 2020-09-08 | End: 2020-09-08

## 2020-09-08 RX ADMIN — ACETAMINOPHEN 1000 MG: 500 TABLET ORAL at 21:59

## 2020-09-08 RX ADMIN — ONDANSETRON 4 MG: 2 INJECTION INTRAMUSCULAR; INTRAVENOUS at 20:35

## 2020-09-08 RX ADMIN — LORAZEPAM 0.5 MG: 0.5 TABLET ORAL at 23:46

## 2020-09-08 NOTE — BSMART NOTE
[facility] is aware of consult and has spoken to ED provider to let him know there are 3 other consults pending. Next [facility] will likely assess patient. ED provider agrees with plan of care.

## 2020-09-08 NOTE — ED TRIAGE NOTES
Patient states she has been having thoughts about suicide for about 3 years. Attempted suicide in March 2020 by overdosing and alcohol. Patient never went to the hospital. In june she made a rope to hang her self but couldn't do it. She wanted to jump off a roof one time and couldn't bring herself to do it. Patient states she cheated on her  that she  last week and he just found out this morning. They just got back their honeymoon lastnight and she hasn't been happy. August 14th patient failed her PT exam and since then she has been very suicidal. Pt states a lot of things are contributing. She has highs and lows and feels manic. Hx of anxiety and depression. Patient very tearful in triage.  in triage with patient.

## 2020-09-08 NOTE — ED PROVIDER NOTES
Date of Service:  9/8/2020    Patient:  Tobin Jefferson    Chief Complaint:  Suicidal       HPI:  Tobin Jefferson is a 26 y.o.  female who presents for evaluation of suicidal thoughts. Patient apparently has a long history of anxiety and depression and suicidal thoughts with no attempt with plans in the past.  The reason for visit this evening is related to her spouse. She was [facility] The spouse just found out that at some point in the past that she had cheated on him. This is exacerbated her anxiety and depression. She has plans of suicide. No specific plan this evening. History of impulsive behavior. No plans of homicide. With the anxiety depression he does have GI issues but that is not acute this evening. Otherwise no other acute complaints. Patient denies any attempts on her life, intoxicating ingestions, alcohol use or other reasons to be in the hospital.            History reviewed. No pertinent past medical history. Past Surgical History:   Procedure Laterality Date    HX TONSIL AND ADENOIDECTOMY      HX TONSILLECTOMY  2003         History reviewed. No pertinent family history. Social History     Socioeconomic History    Marital status: SINGLE     Spouse name: Not on file    Number of children: Not on file    Years of education: Not on file    Highest education level: Not on file   Occupational History    Occupation: college student   Social Needs    Financial resource strain: Not on file    Food insecurity     Worry: Not on file     Inability: Not on file   [employer] needs     Medical: Not on file     Non-medical: Not on file   Tobacco Use    Smoking status: Never Smoker    Smokeless tobacco: Never Used   Substance and Sexual Activity    Alcohol use:  Yes     Alcohol/week: 0.0 standard drinks     Comment: occasionally    Drug use: No    Sexual activity: Yes     Partners: Male     Birth control/protection: Pill   Lifestyle    Physical activity     Days per week: Not on file     Minutes per session: Not on file    Stress: Not on file   Relationships    Social connections     Talks on phone: Not on file     Gets together: Not on file     Attends Jewish service: Not on file     Active member of club or organization: Not on file     Attends meetings of clubs or organizations: Not on file     Relationship status: Not on file    Intimate partner violence     Fear of current or ex partner: Not on file     Emotionally abused: Not on file     Physically abused: Not on file     Forced sexual activity: Not on file   Other Topics Concern    Not on file   Social History Narrative    Not on file         ALLERGIES: Patient has no known allergies. Review of Systems   Constitutional: Negative for fever. HENT: Negative for hearing loss. Eyes: Negative for visual disturbance. Respiratory: Negative for shortness of breath. Cardiovascular: Negative for chest pain. Gastrointestinal: Positive for nausea. Negative for abdominal pain. Genitourinary: Negative for flank pain. Musculoskeletal: Negative for back pain. Skin: Negative for rash. Neurological: Negative for dizziness and light-headedness. Psychiatric/Behavioral: Positive for sleep disturbance and suicidal ideas. The patient is nervous/anxious. Vitals:    09/08/20 1911   BP: 118/86   Pulse: (!) 101   Resp: 22   Temp: 98.5 °F (36.9 °C)   SpO2: 99%            Physical Exam  Constitutional:       General: She is in acute distress. Appearance: She is not ill-appearing, toxic-appearing or diaphoretic. HENT:      Head: Atraumatic. Cardiovascular:      Rate and Rhythm: Normal rate. Pulses: Normal pulses. Pulmonary:      Effort: Pulmonary effort is normal.   Abdominal:      General: Abdomen is flat. Musculoskeletal:         General: No signs of injury. Skin:     General: Skin is warm. Capillary Refill: Capillary refill takes less than 2 seconds. Neurological:      Mental Status: She is alert and oriented to person, place, and time. Psychiatric:         Mood and Affect: Mood is anxious. Thought Content: Thought content includes suicidal ideation. Thought content does not include homicidal ideation. Thought content does not include homicidal plan. Judgment: Judgment is impulsive. MDM      VITAL SIGNS:  Patient Vitals for the past 4 hrs:   Temp Pulse Resp BP SpO2   09/08/20 1911 98.5 °F (36.9 °C) (!) 101 22 118/86 99 %         LABS:  Recent Results (from the past 6 hour(s))   CBC WITH AUTOMATED DIFF    Collection Time: 09/08/20  7:29 PM   Result Value Ref Range    WBC 10.1 3.6 - 11.0 K/uL    RBC 4.47 3.80 - 5.20 M/uL    HGB 14.4 11.5 - 16.0 g/dL    HCT 42.5 35.0 - 47.0 %    MCV 95.1 80.0 - 99.0 FL    MCH 32.2 26.0 - 34.0 PG    MCHC 33.9 30.0 - 36.5 g/dL    RDW 11.5 11.5 - 14.5 %    PLATELET 386 977 - 232 K/uL    MPV 10.1 8.9 - 12.9 FL    NRBC 0.0 0  WBC    ABSOLUTE NRBC 0.00 0.00 - 0.01 K/uL    NEUTROPHILS 81 (H) 32 - 75 %    LYMPHOCYTES 15 12 - 49 %    MONOCYTES 4 (L) 5 - 13 %    EOSINOPHILS 0 0 - 7 %    BASOPHILS 0 0 - 1 %    IMMATURE GRANULOCYTES 0 0.0 - 0.5 %    ABS. NEUTROPHILS 8.1 (H) 1.8 - 8.0 K/UL    ABS. LYMPHOCYTES 1.5 0.8 - 3.5 K/UL    ABS. MONOCYTES 0.4 0.0 - 1.0 K/UL    ABS. EOSINOPHILS 0.0 0.0 - 0.4 K/UL    ABS. BASOPHILS 0.0 0.0 - 0.1 K/UL    ABS. IMM.  GRANS. 0.0 0.00 - 0.04 K/UL    DF AUTOMATED     METABOLIC PANEL, COMPREHENSIVE    Collection Time: 09/08/20  7:29 PM   Result Value Ref Range    Sodium 139 136 - 145 mmol/L    Potassium 3.6 3.5 - 5.1 mmol/L    Chloride 106 97 - 108 mmol/L    CO2 27 21 - 32 mmol/L    Anion gap 6 5 - 15 mmol/L    Glucose 102 (H) 65 - 100 mg/dL    BUN 10 6 - 20 MG/DL    Creatinine 0.82 0.55 - 1.02 MG/DL    BUN/Creatinine ratio 12 12 - 20      GFR est AA >60 >60 ml/min/1.73m2    GFR est non-AA >60 >60 ml/min/1.73m2    Calcium 9.9 8.5 - 10.1 MG/DL    Bilirubin, total 0.6 0.2 - 1.0 MG/DL    ALT (SGPT) 20 12 - 78 U/L    AST (SGOT) 18 15 - 37 U/L    Alk. phosphatase 51 45 - 117 U/L    Protein, total 8.6 (H) 6.4 - 8.2 g/dL    Albumin 5.0 3.5 - 5.0 g/dL    Globulin 3.6 2.0 - 4.0 g/dL    A-G Ratio 1.4 1.1 - 2.2     ETHYL ALCOHOL    Collection Time: 09/08/20  7:29 PM   Result Value Ref Range    ALCOHOL(ETHYL),SERUM <72 <34 MG/DL   SALICYLATE    Collection Time: 09/08/20  7:29 PM   Result Value Ref Range    Salicylate level <9.4 (L) 2.8 - 20.0 MG/DL   ACETAMINOPHEN    Collection Time: 09/08/20  7:29 PM   Result Value Ref Range    Acetaminophen level <2 (L) 10 - 30 ug/mL   URINALYSIS W/ RFLX MICROSCOPIC    Collection Time: 09/08/20  7:29 PM   Result Value Ref Range    Color YELLOW/STRAW      Appearance CLEAR CLEAR      Specific gravity 1.014 1.003 - 1.030      pH (UA) 6.0 5.0 - 8.0      Protein Negative NEG mg/dL    Glucose Negative NEG mg/dL    Ketone 40 (A) NEG mg/dL    Bilirubin Negative NEG      Blood Negative NEG      Urobilinogen 0.2 0.2 - 1.0 EU/dL    Nitrites Negative NEG      Leukocyte Esterase Negative NEG     DRUG SCREEN, URINE    Collection Time: 09/08/20  7:29 PM   Result Value Ref Range    AMPHETAMINES Negative NEG      BARBITURATES Negative NEG      BENZODIAZEPINES Negative NEG      COCAINE Negative NEG      METHADONE Negative NEG      OPIATES Negative NEG      PCP(PHENCYCLIDINE) Negative NEG      THC (TH-CANNABINOL) Positive (A) NEG      Drug screen comment (NOTE)    SAMPLES BEING HELD    Collection Time: 09/08/20  7:29 PM   Result Value Ref Range    SAMPLES BEING HELD SST.GRN. JANEL     COMMENT        Add-on orders for these samples will be processed based on acceptable specimen integrity and analyte stability, which may vary by analyte. URINE CULTURE HOLD SAMPLE    Collection Time: 09/08/20  7:29 PM    Specimen: Serum   Result Value Ref Range    Urine culture hold        Urine on hold in Microbiology dept for 2 days.   If unpreserved urine is submitted, it cannot be used for addtional testing after 24 hours, recollection will be required. SARS-COV-2    Collection Time: 09/08/20  8:42 PM   Result Value Ref Range    Specimen source Nasopharyngeal      Specimen source Nasopharyngeal      COVID-19 rapid test Not detected NOTD      Specimen type NP Swab      Health status Symptomatic Testing          IMAGING:  No orders to display         Medications During Visit:  Medications   ondansetron (ZOFRAN) injection 4 mg (4 mg IntraVENous Given 9/8/20 2035)   acetaminophen (TYLENOL) tablet 1,000 mg (1,000 mg Oral Given 9/8/20 2159)         DECISION MAKING:  Nirali Isidro is a 26 y.o. female who comes in as above. Here, patient arrives tearful. She voices impulsive thoughts and behavior with suicidality site already. At this time patient is medically cleared. I spoke with Segundo Cespedes is aware of the situation I have expressed my strong thoughts that the patient needs to be admitted. At this time she is pending baseline evaluation. Patient will be signed out to Dr. Edwin Hayden for final disposition      IMPRESSION:  1. Suicidal ideation    2.  Depression, unspecified depression type        DISPOSITION:  Transferred to Another Facility - location pending        Procedures

## 2020-09-09 ENCOUNTER — HOSPITAL ENCOUNTER (INPATIENT)
Age: 25
LOS: 5 days | Discharge: HOME | DRG: 885 | End: 2020-09-14
Attending: PSYCHIATRY & NEUROLOGY | Admitting: PSYCHIATRY & NEUROLOGY
Payer: COMMERCIAL

## 2020-09-09 VITALS
HEART RATE: 95 BPM | OXYGEN SATURATION: 98 % | DIASTOLIC BLOOD PRESSURE: 68 MMHG | RESPIRATION RATE: 18 BRPM | TEMPERATURE: 98.2 F | SYSTOLIC BLOOD PRESSURE: 119 MMHG

## 2020-09-09 DIAGNOSIS — F31.63 BIPOLAR I DISORDER, MOST RECENT EPISODE MIXED, SEVERE WITHOUT PSYCHOTIC FEATURES (HCC): ICD-10-CM

## 2020-09-09 DIAGNOSIS — R63.6 UNDERWEIGHT DUE TO INADEQUATE CALORIC INTAKE: ICD-10-CM

## 2020-09-09 LAB — HCG UR QL: NEGATIVE

## 2020-09-09 PROCEDURE — 65220000003 HC RM SEMIPRIVATE PSYCH

## 2020-09-09 PROCEDURE — 96375 TX/PRO/DX INJ NEW DRUG ADDON: CPT

## 2020-09-09 PROCEDURE — 74011250637 HC RX REV CODE- 250/637: Performed by: EMERGENCY MEDICINE

## 2020-09-09 PROCEDURE — 74011250637 HC RX REV CODE- 250/637: Performed by: NURSE PRACTITIONER

## 2020-09-09 PROCEDURE — 81025 URINE PREGNANCY TEST: CPT

## 2020-09-09 PROCEDURE — 74011250636 HC RX REV CODE- 250/636: Performed by: EMERGENCY MEDICINE

## 2020-09-09 RX ORDER — ADHESIVE BANDAGE
30 BANDAGE TOPICAL DAILY PRN
Status: DISCONTINUED | OUTPATIENT
Start: 2020-09-09 | End: 2020-09-14 | Stop reason: HOSPADM

## 2020-09-09 RX ORDER — DULOXETIN HYDROCHLORIDE 30 MG/1
30 CAPSULE, DELAYED RELEASE ORAL DAILY
Status: ON HOLD | COMMUNITY
End: 2020-09-10

## 2020-09-09 RX ORDER — DIPHENHYDRAMINE HYDROCHLORIDE 50 MG/ML
50 INJECTION, SOLUTION INTRAMUSCULAR; INTRAVENOUS
Status: DISCONTINUED | OUTPATIENT
Start: 2020-09-09 | End: 2020-09-09

## 2020-09-09 RX ORDER — TRAZODONE HYDROCHLORIDE 50 MG/1
50 TABLET ORAL
Status: DISCONTINUED | OUTPATIENT
Start: 2020-09-09 | End: 2020-09-09

## 2020-09-09 RX ORDER — LORAZEPAM 2 MG/ML
0.5 INJECTION INTRAMUSCULAR
Status: DISCONTINUED | OUTPATIENT
Start: 2020-09-09 | End: 2020-09-14 | Stop reason: HOSPADM

## 2020-09-09 RX ORDER — HYDROXYZINE 25 MG/1
25 TABLET, FILM COATED ORAL
Status: DISCONTINUED | OUTPATIENT
Start: 2020-09-09 | End: 2020-09-14 | Stop reason: HOSPADM

## 2020-09-09 RX ORDER — BENZTROPINE MESYLATE 1 MG/1
0.5 TABLET ORAL
Status: DISCONTINUED | OUTPATIENT
Start: 2020-09-09 | End: 2020-09-14 | Stop reason: HOSPADM

## 2020-09-09 RX ORDER — BENZTROPINE MESYLATE 1 MG/1
1 TABLET ORAL
Status: DISCONTINUED | OUTPATIENT
Start: 2020-09-09 | End: 2020-09-09

## 2020-09-09 RX ORDER — HALOPERIDOL 5 MG/ML
2.5 INJECTION INTRAMUSCULAR
Status: DISCONTINUED | OUTPATIENT
Start: 2020-09-09 | End: 2020-09-14 | Stop reason: HOSPADM

## 2020-09-09 RX ORDER — LORAZEPAM 2 MG/ML
1 INJECTION INTRAMUSCULAR
Status: DISCONTINUED | OUTPATIENT
Start: 2020-09-09 | End: 2020-09-09

## 2020-09-09 RX ORDER — LORAZEPAM 2 MG/ML
0.5 INJECTION INTRAMUSCULAR
Status: COMPLETED | OUTPATIENT
Start: 2020-09-09 | End: 2020-09-09

## 2020-09-09 RX ORDER — HYDROXYZINE 25 MG/1
50 TABLET, FILM COATED ORAL
Status: DISCONTINUED | OUTPATIENT
Start: 2020-09-09 | End: 2020-09-09

## 2020-09-09 RX ORDER — TRAZODONE HYDROCHLORIDE 50 MG/1
25 TABLET ORAL
Status: DISCONTINUED | OUTPATIENT
Start: 2020-09-09 | End: 2020-09-14 | Stop reason: HOSPADM

## 2020-09-09 RX ORDER — OLANZAPINE 5 MG/1
5 TABLET ORAL
Status: DISCONTINUED | OUTPATIENT
Start: 2020-09-09 | End: 2020-09-09

## 2020-09-09 RX ORDER — DIPHENHYDRAMINE HYDROCHLORIDE 50 MG/ML
25 INJECTION, SOLUTION INTRAMUSCULAR; INTRAVENOUS
Status: DISCONTINUED | OUTPATIENT
Start: 2020-09-09 | End: 2020-09-14 | Stop reason: HOSPADM

## 2020-09-09 RX ORDER — ACETAMINOPHEN 325 MG/1
650 TABLET ORAL
Status: DISCONTINUED | OUTPATIENT
Start: 2020-09-09 | End: 2020-09-09

## 2020-09-09 RX ORDER — ACETAMINOPHEN 325 MG/1
650 TABLET ORAL
Status: DISCONTINUED | OUTPATIENT
Start: 2020-09-09 | End: 2020-09-12

## 2020-09-09 RX ORDER — OLANZAPINE 2.5 MG/1
2.5 TABLET ORAL
Status: DISCONTINUED | OUTPATIENT
Start: 2020-09-09 | End: 2020-09-14 | Stop reason: HOSPADM

## 2020-09-09 RX ORDER — ACETAMINOPHEN 325 MG/1
650 TABLET ORAL
Status: COMPLETED | OUTPATIENT
Start: 2020-09-09 | End: 2020-09-09

## 2020-09-09 RX ORDER — ADHESIVE BANDAGE
30 BANDAGE TOPICAL DAILY PRN
Status: DISCONTINUED | OUTPATIENT
Start: 2020-09-09 | End: 2020-09-09 | Stop reason: SDUPTHER

## 2020-09-09 RX ADMIN — LORAZEPAM 0.5 MG: 2 INJECTION INTRAMUSCULAR; INTRAVENOUS at 09:10

## 2020-09-09 RX ADMIN — TRAZODONE HYDROCHLORIDE 25 MG: 50 TABLET ORAL at 22:21

## 2020-09-09 RX ADMIN — ACETAMINOPHEN 650 MG: 325 TABLET ORAL at 11:55

## 2020-09-09 RX ADMIN — HYDROXYZINE HYDROCHLORIDE 25 MG: 25 TABLET ORAL at 22:21

## 2020-09-09 NOTE — BSMART NOTE
Comprehensive Assessment Form Part 1 Section I - Disposition Axis I - Depressive Disorder R/O Bipolar Disorder Marijuana Dependence Axis II - Deferred Axis III - Healthy Axis IV - Lack of structure, unemployed Axis V - The Medical Doctor to Psychiatrist conference was not completed. The Medical Doctor is in agreement with Psychiatrist disposition because of (reason) consents to voluntary admission. The plan is admit to behavioral health. The on-call Psychiatrist consulted was Dr. Radha Long. The admitting Psychiatrist will be Dr. Radha Long. The admitting Diagnosis is Depressive Disorder. The Payor source is [employer] [employer]*. The name of the representative was . This was approved for  days. The authorization number is . Section II - Integrated Summary Summary:  Patient is 26year old female reporting to ED Patient states she has been having thoughts about suicide for about 3 years. Attempted suicide in March 2020 by overdosing and alcohol. Patient never went to the hospital. In june she made a rope to hang her self but couldn't do it. She wanted to jump off a roof one time and couldn't bring herself to do it. Patient states she cheated on her  that she  last week and he just found out this morning. They just got back their honeymoon lastnight and she hasn't been happy. August 14th patient failed her PT exam and since then she has been very suicidal. Pt states a lot of things are contributing. She has highs and lows and feels manic. Hx of anxiety and depression. Patient very tearful in triage.  in triage with patient. At bedside, patient was emotional, anxious, depressed but cooperative with this writer. Patient stated she was coming to ED because she does not want to live anymore. Patient reported those feelings do come and go but most recently she has had increased feelings.  Patient reported being suicidal at the times of assessment. Patient did not verbalize any current plans as she reported she has had difficulty thinking straight. Patient denied homicidal thoughts and hallucinations. Patient reported previous plans and attempts. Patient reported about two years go she had a plan to jump off tall building where her  lived, March 2020 Alcohol Intoxication and Drug overdose as reported she took a lot of pills. Patient reported she really wanted to die then. Patient reported a couple of months ago she made noose but couldn't do it. As reported she continues to think about how she would hurt others and she does not want to do that but she does not want to live. Patient reported over the past 2-3 months she has had difficulty remembering things, racing thoughts and being disconnected. Patient reported their is a bad side of her that she does things and she does not know why. Patient reported that she is not honest about things in her life. Patient reported she feels anxious about everything talking with her parents, her  and others. Patient reported having mood changes as reported things would be fine and then she will start screaming and then she could be smiling at the next person. Patient reported she may have hit a wall before but denies other aggressive actions. Patient reported having a lack of energy. As reported she does not work and stays in the bed all day, patient reported not wanting to get up. Patient reported she has lost 13 pounds, has not been about keep food down most days. Patient reported drinking and smoking (marijuana daily) as reported because she will eat something when she does but not consistently. As reported she hasnot been drinking excessively but may have  4 drinks in one night at times. Patient stated \"I feel like it is always something I don't know if its all in head or what\".  Patient reported her  recently found other this week that she cheated on him and she does not even know what she did it and as reported she has done it more than once. Patient continued to verbalize how wonderful he was and not understanding her actions. Patient was in PT school and as reported she finished but failed her exam on 15th, patient reported getting  on 29th. 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 Patient reported she was having difficulty functioning at that time as reported she would get their late when she normally was an person that comes 15 mintues before. Patient has had previous therapy but could not connect with therapist so she stopped. Patient consents to voluntary admission, locally. The patienthas demonstrated mental capacity to provide informed consent. The information is given by the patient. The Chief Complaint is suicidal, depression. The Precipitant Factors are increased life changes and symptoms. Previous Hospitalizations: no The patient has not previously been in restraints. Current Psychiatrist and/or  is none. Lethality Assessment: 
 
The potential for suicide noted by the following: ideation . The potential for homicide is not noted. The patient has not been a perpetrator of sexual or physical abuse. There are not pending charges. The patient is not felt to be at risk for self harm or harm to others. The attending nurse was advised patient contracts for safety. Section III - Psychosocial 
The patient's overall mood and attitude is low mood, anxious, depressed, cooperative. Feelings of helplessness and hopelessness are not observed. Generalized anxiety is not observed. Panic is not observed. Phobias are not observed. Obsessive compulsive tendencies are not observed. Section IV - Mental Status Exam 
The patient's appearance shows no evidence of impairment.   The patient's behavior shows no evidence of impairment. The patient is oriented to time, place, person and situation. The patient's speech shows no evidence of impairment. The patient's mood is depressed and is anxious. The range of affect shows no evidence of impairment. The patient's thought content demonstrates no evidence of impairment. The thought process shows no evidence of impairment. The patient's perception shows no evidence of impairment. The patient's memory shows no evidence of impairment. The patient's appetite shows no evidence of impairment. The patient's sleep shows no evidence of impairment. The patient shows little insight. The patient's judgement shows no evidence of impairment. Section V - Substance Abuse The patient is using substances. The patient is using cannabis by inhalation for 5-10 years with last use on yesterday. The patient has experienced the following withdrawal symptoms: N/A. Section VI - Living Arrangements The patient is . The spouses approximate age is unknown and appears to be in unknown health. The patient lives with a spouse. The patient has no children. The patient does plan to return home upon discharge. The patient does not have legal issues pending. The patient's source of income comes from family. Restorationism and cultural practices have not been voiced at this time. The patient's greatest support comes from , jarrett and this person will be involved with the treatment. The patient has not been in an event described as horrible or outside the realm of ordinary life experience either currently or in the past. 
The patient has not been a victim of sexual/physical abuse. Section VII - Other Areas of Clinical Concern The highest grade achieved is college educated with the overall quality of school experience being described as good.  
The patient is currently unemployed and speaks [place] as a primary language. The patient has no communication impairments affecting communication. The patient's preference for learning can be described as: can read and write adequately.   The patient's hearing is normal.  The patient's vision is normal. 
 
 
Alfonso Correia MA

## 2020-09-09 NOTE — GROUP NOTE
LEON  GROUP DOCUMENTATION INDIVIDUAL Group Therapy Note Date: 9/9/2020 Group Start Time: 1500 Group End Time: 0725 Group Topic: Recreational/Music Therapy [address] [facility] 3 ACUTE BEHAV [facility], [facility] GROUP DOCUMENTATION GROUP Group Therapy Note Attendees: 8 Attendance: Did not attend Patient's Goal: Interventions/techniques: 
 
Adebayo Jean

## 2020-09-09 NOTE — ED NOTES
TRANSFER - OUT REPORT:    Verbal report given to Vivek Derrick (name) on [facility]  being transferred to [facility] (unit) for transfer    Report consisted of patients Situation, Background, Assessment and   Recommendations(SBAR). Information from the following report(s) SBAR, ED Summary, [facility] and Recent Results was reviewed with the receiving nurse. Lines:   Peripheral IV 09/08/20 Left Antecubital (Active)   Site Assessment Clean, dry, & intact 09/08/20 1921   Phlebitis Assessment 0 09/08/20 1921   Infiltration Assessment 0 09/08/20 1921   Dressing Status Clean, dry, & intact 09/08/20 1921   Dressing Type Tape;Transparent 09/08/20 1921   Hub Color/Line Status Green;Flushed;Patent;Capped 09/08/20 1921   Action Taken Blood drawn 09/08/20 1921        Opportunity for questions and clarification was provided.       Patient transported with:   LINDSAY

## 2020-09-09 NOTE — ED NOTES
10:20 PM  Change of shift. Care of patient taken over from Dr. Dianelys Machado; H&P reviewed, bedside handoff complete. Awaiting COVID results and BSMART evaluation. 11:05 PM  Rapid cover test comes back negative. Patient being seen by psychiatric services at the present time. 11:37 PM  Patient was seen by psychiatric services. aMl Harry will look for a bed for the patient to be transferred to.    2:08 AM  Discussed with BSMART. Currently there are no psychiatric beds available. They are hoping that some beds will free up midmorning. Discussed with patient and mother about this. Patient resting comfortably in her room. 6:06 AM  Change of shift. Care of patient signed over to Dr. Krishnamurthy Else. Bedside handoff complete. Awaiting bed placement.

## 2020-09-09 NOTE — ED NOTES
Went and spoke to family and patient at this time, informed patient on [facility] policy, pt is allowed to have four sets of clothing, no strings allowed, pt is not allowed any electronic devices; recommended all jewelery sent with family member. [facility] has no visitor policy at this time.   ED doctor to speak to family per mother request.

## 2020-09-09 NOTE — BSMART NOTE
Patient accepted to [address] [place] [address] by Manda Benoit NP under Dr. Destin ybarra. room 314 bed 1 .

## 2020-09-09 NOTE — ED NOTES
Bedside and Verbal shift change report given to Tyrone Perez (oncoming nurse) by Martine Cruz (offgoing nurse). Report included the following information SBAR, ED Summary, MAR and Recent Results.

## 2020-09-09 NOTE — BH NOTES
Admission Note    26year old  female, admitted to unit with primary diagnosis of bipolar disorder. Patient Admitted from [facility] as a Voluntary admission. Patient is alert and oriented x4. Patient admitted due to suicidal ideation. Patients  discovered patient was cheating as of yesterday. Patient states that everything is too [place]" and that she feels overwhelmed with life. Patient is intermittently tearful upon conversation. Patient has 3 past suicide attempts. Patient reports 2 years ago around [place], march 2020 etoh, July 2020 fastened a noose but didn't hang it. Patient has tried various SSRI's to manage depression with no success. Patient has , mother, father, 3 sisters, and a brother as a support system. Patient stated she has a hard time opening up to family and only has done so recently. Patient is very anxious and struggling with acceptance of admission and the need for help. Patient has lost a total of 13 lbs in the last month and thinks it may be related to her anxiety and depression. Patient states that she understands she needs to eat but often produces emesis after meals or nausea that prevents her from eating. UDS THC, BAL negative. Patient cooperative with admission process and tearful. Patient oriented to unit. Admission packet and confidentiality code given. Q 15 minute checks and 1:1 precautions initiated for safety.

## 2020-09-09 NOTE — PROGRESS NOTES
Problem: Suicide  Goal: *STG: Remains safe in hospital  Outcome: Progressing Towards Goal  Goal: *STG: Seeks staff when feelings of self harm or harm towards others arise  Outcome: Progressing Towards Goal  Goal: *STG:  Verbalizes alternative ways of dealing with maladaptive feelings/behaviors  Outcome: Progressing Towards Goal     Problem: Patient Education: Go to Patient Education Activity  Goal: Patient/Family Education  Outcome: Progressing Towards Goal     Problem: Anxiety-Behavioral Health (Adult/Pediatric)  Goal: *STG: Participates in treatment plan  Outcome: Progressing Towards Goal  Goal: *STG: Remains safe in hospital  Outcome: Progressing Towards Goal  Goal: *STG: Seeks staff when feelings of anxiety and fear arise  Outcome: Progressing Towards Goal

## 2020-09-09 NOTE — ED NOTES
Per bsmart, patient will be placed at [facility], she will call back with assignment.  Reported to [facility]

## 2020-09-09 NOTE — BSMART NOTE
[facility] unable to admit at this time. [facility] is at capacity.   
[facility] will review for possible admission after 9am.

## 2020-09-09 NOTE — ED NOTES
TRANSFER - OUT REPORT:    Verbal report given to [facility] RN(name) on Sunni Velarde  being transferred to [facility](unit) for routine progression of care       Report consisted of patients Situation, Background, Assessment and   Recommendations(SBAR). Information from the following report(s) SBAR, Kardex, ED Summary, [facility] and Recent Results was reviewed with the receiving nurse. Lines:   Peripheral IV 09/08/20 Left Antecubital (Active)   Site Assessment Clean, dry, & intact 09/08/20 1921   Phlebitis Assessment 0 09/08/20 1921   Infiltration Assessment 0 09/08/20 1921   Dressing Status Clean, dry, & intact 09/08/20 1921   Dressing Type Tape;Transparent 09/08/20 1921   Hub Color/Line Status Green;Flushed;Patent;Capped 09/08/20 1921   Action Taken Blood drawn 09/08/20 1921        Opportunity for questions and clarification was provided.       Patient transported with:   LINDSAY

## 2020-09-09 NOTE — BSMART NOTE
Received phone call from patients RN about patient being tearful/sad/scared about admitting to [facility] asking if [facility] could speak with patient. Patient had multiple questions about care, medications, program details, expectations while on unit. Patient was tearful, sad, anxious, scared about admission. Educated patient about inpatient hospitalizations. Patient gave verbal permission for Writer to speak with her mother, relayed same information to patients mother and mother asked further questions. Both mother and patient stated  their questions were  answered.

## 2020-09-09 NOTE — ED NOTES
Bedside and Verbal shift change report given to [facility], [address] [place] [address] (oncoming nurse) by KATHI Chatman (offgoing nurse). Report included the following information SBAR and MAR.

## 2020-09-09 NOTE — ED NOTES
Spoke with Escobar Whitley from [facility]. Primary nurse was updated on bed search. Primary nurse informed patient's mother of current situation.

## 2020-09-09 NOTE — BSMART NOTE
Patient accepted to [facility] by Dr. Renee Hinojosa. Nurse report [phone] ext: [phone].  Patient cannot be transported until after 9am.

## 2020-09-10 PROBLEM — R63.6 UNDERWEIGHT DUE TO INADEQUATE CALORIC INTAKE: Status: ACTIVE | Noted: 2020-09-10

## 2020-09-10 PROBLEM — F31.9 BIPOLAR 1 DISORDER (HCC): Status: ACTIVE | Noted: 2020-09-10

## 2020-09-10 PROBLEM — F31.9 BIPOLAR 1 DISORDER (HCC): Status: RESOLVED | Noted: 2020-09-10 | Resolved: 2020-09-10

## 2020-09-10 PROBLEM — F31.63 BIPOLAR I DISORDER, MOST RECENT EPISODE MIXED, SEVERE WITHOUT PSYCHOTIC FEATURES (HCC): Status: ACTIVE | Noted: 2020-09-10

## 2020-09-10 PROCEDURE — 74011250637 HC RX REV CODE- 250/637: Performed by: NURSE PRACTITIONER

## 2020-09-10 PROCEDURE — 74011250637 HC RX REV CODE- 250/637: Performed by: PSYCHIATRY & NEUROLOGY

## 2020-09-10 PROCEDURE — 99223 1ST HOSP IP/OBS HIGH 75: CPT | Performed by: PSYCHIATRY & NEUROLOGY

## 2020-09-10 PROCEDURE — 65220000003 HC RM SEMIPRIVATE PSYCH

## 2020-09-10 RX ORDER — ALBUTEROL SULFATE 90 UG/1
1 AEROSOL, METERED RESPIRATORY (INHALATION)
COMMUNITY

## 2020-09-10 RX ORDER — LITHIUM CARBONATE 300 MG/1
300 TABLET, FILM COATED, EXTENDED RELEASE ORAL EVERY 12 HOURS
Status: DISCONTINUED | OUTPATIENT
Start: 2020-09-10 | End: 2020-09-14 | Stop reason: HOSPADM

## 2020-09-10 RX ORDER — LOPERAMIDE HYDROCHLORIDE 2 MG/1
2 CAPSULE ORAL
Status: DISCONTINUED | OUTPATIENT
Start: 2020-09-10 | End: 2020-09-14 | Stop reason: HOSPADM

## 2020-09-10 RX ORDER — ALBUTEROL SULFATE 90 UG/1
1 AEROSOL, METERED RESPIRATORY (INHALATION)
Status: DISCONTINUED | OUTPATIENT
Start: 2020-09-10 | End: 2020-09-14 | Stop reason: HOSPADM

## 2020-09-10 RX ADMIN — TRAZODONE HYDROCHLORIDE 25 MG: 50 TABLET ORAL at 21:54

## 2020-09-10 RX ADMIN — LITHIUM CARBONATE 300 MG: 300 TABLET, FILM COATED, EXTENDED RELEASE ORAL at 21:54

## 2020-09-10 RX ADMIN — LITHIUM CARBONATE 300 MG: 300 TABLET, FILM COATED, EXTENDED RELEASE ORAL at 12:34

## 2020-09-10 RX ADMIN — LOPERAMIDE HYDROCHLORIDE 2 MG: 2 CAPSULE ORAL at 21:54

## 2020-09-10 RX ADMIN — HYDROXYZINE HYDROCHLORIDE 25 MG: 25 TABLET ORAL at 18:59

## 2020-09-10 RX ADMIN — HYDROXYZINE HYDROCHLORIDE 25 MG: 25 TABLET ORAL at 08:33

## 2020-09-10 NOTE — PROGRESS NOTES
2211: Pt told writer she would like something to help her anxiety and sleep. Writer pulled PRN atarax and trazodone. Pt wanted to take her shower then told writer she would take her PRN after. Pt accepted medication.

## 2020-09-10 NOTE — BH NOTES
Patient alert and verbal. Patient was pleasant upon morning assessment. Patient denies HI and AVH. Patient endorses anxiety and depression. Patient administered PRN Atarax 25 mg to help with anxiety. Patient was very tearful when asked if she wanted to attend groups today. Patient indicated that she has social anxiety. Patient attended the afternoon groups and indicated that she enjoyed that it. 1851-Patient is very tearful in room. Patient indicated that she has questions regarding her condition and would like therapy. Patient stated that she been having diarrhea all day and can't hold any food down. NP from [facility] was notified and ordered Imodium QID PRN. Patient gave this writer a list of questions she would like to be answered. This writer informed patient to give list of questions to  during treatment team tomorrow. Q15 minute checks continued for safety.          Problem: Suicide  Goal: *STG: Remains safe in hospital  Outcome: Progressing Towards Goal  Goal: *STG: Attends activities and groups  Outcome: Progressing Towards Goal  Goal: *STG/LTG: Complies with medication therapy  Outcome: Progressing Towards Goal

## 2020-09-10 NOTE — PROGRESS NOTES
Laboratory monitoring for mood stabilizer and antipsychotics:    Recommended baseline monitoring has been completed based on this patient's current medication regimen. The patient is currently taking the following medication(s):   Current Facility-Administered Medications   Medication Dose Route Frequency    lithium carbonate SR (LITHOBID) tablet 300 mg  300 mg Oral Q12H       Height, Weight, BMI Estimation  Estimated body mass index is 18.16 kg/m² as calculated from the following:    Height as of this encounter: 165.1 cm (65\"). Weight as of this encounter: 49.5 kg (109 lb 1.6 oz). Renal Function, Hepatic Function and Chemistry  Estimated Creatinine Clearance: 82.7 mL/min (by C-G formula based on SCr of 0.82 mg/dL). Lab Results   Component Value Date/Time    Sodium 139 09/08/2020 07:29 PM    Potassium 3.6 09/08/2020 07:29 PM    Chloride 106 09/08/2020 07:29 PM    CO2 27 09/08/2020 07:29 PM    Anion gap 6 09/08/2020 07:29 PM    Glucose 102 (H) 09/08/2020 07:29 PM    BUN 10 09/08/2020 07:29 PM    Creatinine 0.82 09/08/2020 07:29 PM    BUN/Creatinine ratio 12 09/08/2020 07:29 PM    GFR est AA >60 09/08/2020 07:29 PM    GFR est non-AA >60 09/08/2020 07:29 PM    Calcium 9.9 09/08/2020 07:29 PM    ALT (SGPT) 20 09/08/2020 07:29 PM    Alk.  phosphatase 51 09/08/2020 07:29 PM    Protein, total 8.6 (H) 09/08/2020 07:29 PM    Albumin 5.0 09/08/2020 07:29 PM    Globulin 3.6 09/08/2020 07:29 PM    A-G Ratio 1.4 09/08/2020 07:29 PM    Bilirubin, total 0.6 09/08/2020 07:29 PM       Lab Results   Component Value Date/Time    Glucose 102 (H) 09/08/2020 07:29 PM       Lab Results   Component Value Date/Time    Hemoglobin A1c 4.8 09/11/2020 05:37 AM       Hematology  Lab Results   Component Value Date/Time    WBC 10.1 09/08/2020 07:29 PM    Hemoglobin (POC) 13.1 05/23/2017 09:48 AM    HGB 14.4 09/08/2020 07:29 PM    HCT 42.5 09/08/2020 07:29 PM    PLATELET 416 65/70/7415 07:29 PM    MCV 95.1 09/08/2020 07:29 PM Lipids  Lab Results   Component Value Date/Time    Cholesterol, total 172 09/11/2020 05:37 AM    HDL Cholesterol 62 09/11/2020 05:37 AM    LDL, calculated 92.8 09/11/2020 05:37 AM    Triglyceride 86 09/11/2020 05:37 AM    CHOL/HDL Ratio 2.8 09/11/2020 05:37 AM       Thyroid Function    Lab Results   Component Value Date/Time    TSH 1.15 09/11/2020 05:37 AM       Visit Vitals  /76 (BP 1 Location: Left arm, BP Patient Position: Sitting)   Pulse 90   Temp 98.3 °F (36.8 °C)   Resp 18   Ht 165.1 cm (65\")   Wt 49.5 kg (109 lb 1.6 oz)   SpO2 100%   Breastfeeding No   BMI 18.16 kg/m²       Pregnancy Test  Lab Results   Component Value Date/Time    Pregnancy test,urine (POC) Negative 09/09/2020 12:36 PM       Thank you,  Cody Staff, Pharm. D.  [phone]

## 2020-09-10 NOTE — PROGRESS NOTES
Problem: Suicide  Goal: *STG/LTG: No longer expresses self destructive or suicidal thoughts  Outcome: Not Progressing Towards Goal  Goal: *LTG:  Develops proactive suicide prevention plan  Outcome: Not Progressing Towards Goal

## 2020-09-10 NOTE — GROUP NOTE
LEON  GROUP DOCUMENTATION INDIVIDUAL Group Therapy Note Date: 9/10/2020 Group Start Time: 1000 Group End Time: 1100 Group Topic: Topic Group [facility] 3 ACUTE BEHAV [facility], [address] GROUP DOCUMENTATION GROUP Group Therapy Note Attendees: 1 Attendance: Attended Patient's Goal:  To participate in stress management [employer] game Interventions/techniques: Supported-things pertaining to stress Follows Directions: Followed directions Interactions: Interacted appropriately Mental Status: Calm Behavior/appearance: Attentive, Cooperative and Needed prompting Goals Achieved: Able to engage in interactions, Able to listen to others and Discussed coping Additional Notes:  Pleasant-identified effective ways to deal with stress Quilla Lux

## 2020-09-10 NOTE — BH NOTES
Patient sitting on her be conversing with staff ,Patient is depressed and having suicidal thoughts denies AVH. Patient is under a lot of personal stress including college and her recent marriage she is very open and forth coming about her problems.   Slept 6.5 hours

## 2020-09-10 NOTE — BH NOTES
Nurse was called one to one order was not placed. Order is visible under kardex. Nursing supervisor notified.  Pt remains on a 1:1 until she is assessed by MD.

## 2020-09-10 NOTE — GROUP NOTE
LEON  GROUP DOCUMENTATION INDIVIDUAL Group Therapy Note Date: 9/10/2020 Group Start Time: 1400 Group End Time: 1500 Group Topic: Recreational/Music Therapy [facility] 3 ACUTE BEHAV [facility], [address] GROUP DOCUMENTATION GROUP Group Therapy Note Attendees: 5 Attendance: Attended Patient's Goal:  To concentrate on selected task Interventions/techniques: Supported-crafts,games,music Follows Directions: Followed directions Interactions: Interacted appropriately Mental Status: Calm Behavior/appearance: Attentive and Cooperative Goals Achieved: Able to engage in interactions and Able to listen to others Additional Notes:  Pleasant,smiling-socialized with peers-completed task Joan Agarwal

## 2020-09-10 NOTE — BH NOTES
PSYCHOSOCIAL ASSESSMENT  :Patient identifying info:  Lili Kim is a 26 y.o., female admitted 9/9/2020  2:48 PM     Presenting problem and precipitating factors: \"I've been really done and not feeling myself. \" Pt reports previous OD on alcohol and pills in March 2020. Graduated PT school and got  in August 2020, admitted to past affair, lost 13 pounds in the last month, failed PT exam by 2 points and next exam was scheduled for today 9/10/2020. Mental status assessment: Labile, logical    Strengths: Family support 4 siblings, parents and . Collateral informationKatrosanne Cameron - [phone], [phone]    Current psychiatric /substance abuse providers and contact info: None     Previous psychiatric/substance abuse providers and response to treatment: Previously saw a therapist through [facility] and did not feel it was a good fit. Family history of mental illness or substance abuse: maternal uncle - bipolar dx    Substance abuse history:   UDS +THC; BAL=0  Social History     Tobacco Use    Smoking status: Never Smoker    Smokeless tobacco: Never Used   Substance Use Topics    Alcohol use: Yes     Alcohol/week: 0.0 standard drinks     Comment: occasionally       History of biomedical complications associated with substance abuse :None    Patient's current acceptance of treatment or motivation for change: Fair     Family constellation: , without children     Is significant other involved? Yes     Describe support system: parents,  and 4 siblings     Describe living arrangements and home environment: Lives with      Health issues:   Hospital Problems  Date Reviewed: 2/21/2020          Codes Class Noted POA    [facility] ICD-10-CM: F31.9  ICD-9-CM: 296.7  9/10/2020 Unknown              Trauma history:  None reported     Legal issues: None reported.      History of  service: No    Financial status: Family     Judaism/cultural factors: None expressed at this time.      Education/work history:  College graduate     Have you been licensed as a health care professional (current or ): no     Leisure and recreation preferences: running - but due to previous injury has not been running     Describe coping skills: Serrato Else  9/10/2020

## 2020-09-10 NOTE — PROGRESS NOTES
[facility] Admission Pharmacy Medication Reconciliation     Information obtained from: Patient interview, RxQuery  RxQuery data available1: Yes    Comments/recommendations:    1) Reports she is supposed to be on duloxetine 30 mg daily & trazodone 25 mg QHS but has not taken consistently in at least 2 months. 2) Reports history of trying SSRIs in the past including sertraline, citalopram, and escitalopram.  Developed \"weird\" side effects while on them including restless legs, yawning, and hyperhydrosis. Has not tried higher doses of duloxetine or any other SNRIs. 3) Medication changes (since last review): Added  Albuterol inhaler  Kyleena intrauterine device  Removed  Drospirenone-ethinyl estradiol  Duloxetine  Trazodone   1RxQuery pharmacy benefit data reflects medications filled and processed through the patient's insurance, however                this data does NOT capture whether the medication was picked up or is currently being taken by the patient. Total Time Spent: 10 minutes    Past Medical History/Disease States:  No past medical history on file. Patient allergies: Allergies as of 09/09/2020 - Review Complete 02/21/2020   Allergen Reaction Noted    Latex Hives 09/09/2020         Prior to Admission Medications   Prescriptions Last Dose Informant Patient Reported? Taking? albuterol (PROVENTIL HFA, VENTOLIN HFA, PROAIR HFA) 90 mcg/actuation inhaler 6 months ago  Yes Yes   Sig: Take 1 Puff by inhalation every six (6) hours as needed for Wheezing or Shortness of Breath. levonorgestrel (KYLEENA IU)   Yes Yes   Sig: by IntraUTERine route.  Every 5 years - placed in February 2020      Facility-Administered Medications: None        Thank you,  Husam Rankin, [facility], [address]: [phone] ([id number])  Pharmacy: [phone] ([id number])

## 2020-09-10 NOTE — H&P
INITIAL PSYCHIATRIC EVALUATION            IDENTIFICATION:    Patient Name  Lili Kim   Date of Birth 1995   [facility] [id number]   Medical Record Number  [id number]      Age  26 y.o. PCP An Anguiano MD   Admit date:  9/9/2020    Room Number  312/01  @ [facility]   Date of Service  9/10/2020            HISTORY         REASON FOR HOSPITALIZATION:  CC: \"suicidal ideation\". Pt admitted under a voluntary basis for suicidal ideation proving to be an imminent danger to self and an inability to care for self. HISTORY OF PRESENT ILLNESS:    The patient, Lili Kim, is a 26 y.o. WHITE [place]  female with a past psychiatric history significant for generalized anxiety disorder and potential bipolar disorder, who presents at this time with complaints of (and/or evidence of) the following emotional symptoms: depression and suicidal thoughts/threats. Additional symptomatology include increased interpersonal stressors. The above symptoms have been present for 2+ weeks. These symptoms are of moderate to high severity. These symptoms are constant in nature. The patient's condition has been precipitated by psychosocial stressors. Patient's condition made worse by continued illicit drug use as well as treatment noncompliance. UDS: +THC; BAL=0. Per admission paperwork, patient has been more distressed due to various familial stressors, including infidelity and recently failing an exam for her job. She describes SI, poor concentration, risky behavior and increased marijuana. The patient is a fair historian. The patient corroborates the above narrative. The patient contracts for safety on the unit and gives consent for the team to contact collateral. The patient is amenable to initiating treatment while on the unit. After a discussion of risk and benefit, patient agrees with starting lithium.      ALLERGIES:   Allergies   Allergen Reactions    Latex Hives      MEDICATIONS PRIOR TO ADMISSION: Medications Prior to Admission   Medication Sig    albuterol (PROVENTIL HFA, VENTOLIN HFA, PROAIR HFA) 90 mcg/actuation inhaler Take 1 Puff by inhalation every six (6) hours as needed for Wheezing or Shortness of Breath.  levonorgestrel (KYLEENA IU) by IntraUTERine route. Every 5 years - placed in February 2020      PAST MEDICAL HISTORY:   No past medical history on file. Past Surgical History:   Procedure Laterality Date    HX TONSIL AND ADENOIDECTOMY      HX TONSILLECTOMY  2003      SOCIAL HISTORY:   Social History     Socioeconomic History    Marital status: SINGLE     Spouse name: Not on file    Number of children: Not on file    Years of education: Not on file    Highest education level: Not on file   Occupational History    Occupation: college student   Social Needs    Financial resource strain: Not on file    Food insecurity     Worry: Not on file     Inability: Not on file   [employer] needs     Medical: Not on file     Non-medical: Not on file   Tobacco Use    Smoking status: Never Smoker    Smokeless tobacco: Never Used   Substance and Sexual Activity    Alcohol use:  Yes     Alcohol/week: 0.0 standard drinks     Comment: occasionally    Drug use: No    Sexual activity: Yes     Partners: Male     Birth control/protection: Pill   Lifestyle    Physical activity     Days per week: Not on file     Minutes per session: Not on file    Stress: Not on file   Relationships    Social connections     Talks on phone: Not on file     Gets together: Not on file     Attends Hoahaoism service: Not on file     Active member of club or organization: Not on file     Attends meetings of clubs or organizations: Not on file     Relationship status: Not on file    Intimate partner violence     Fear of current or ex partner: Not on file     Emotionally abused: Not on file     Physically abused: Not on file     Forced sexual activity: Not on file   Other Topics Concern    Not on file   Social History Narrative    Not on file      FAMILY HISTORY: History reviewed, pertinent family history as below:   No family history on file. REVIEW OF SYSTEMS:   Pertinent items are noted in the History of Present Illness. All other Systems reviewed and are considered negative. MENTAL STATUS EXAM & VITALS     MENTAL STATUS EXAM (MSE):    MSE FINDINGS ARE WITHIN NORMAL LIMITS (WNL) UNLESS OTHERWISE STATED BELOW. ( ALL OF THE BELOW CATEGORIES OF THE MSE HAVE BEEN REVIEWED (reviewed 9/10/2020) AND UPDATED AS DEEMED APPROPRIATE )  General Presentation age appropriate, cooperative   Orientation oriented to time, place and person   Vital Signs  See below (reviewed 9/10/2020); Vital Signs (BP, Pulse, & Temp) are within normal limits if not listed below.    Gait and Station Stable/steady, no ataxia   Musculoskeletal System No extrapyramidal symptoms (EPS); no abnormal muscular movements or Tardive Dyskinesia (TD); muscle strength and tone are within normal limits   Language No aphasia or dysarthria   Speech:  normal volume and non-pressured   Thought Processes coherent; normal rate of thoughts; fair abstract reasoning/computation   Thought Associations goal directed   Thought Content preoccupations   Suicidal Ideations contracts for safety   Homicidal Ideations none   Mood:  anxious  and depressed   Affect:  full range and mood-congruent   Memory recent  intact   Memory remote:  intact   Concentration/Attention:  intact   Fund of Knowledge average   Insight:  limited   Reliability fair   Judgment:  impaired due to suicidality          VITALS:     Patient Vitals for the past 24 hrs:   Temp Pulse Resp BP SpO2   09/10/20 0812 97.6 °F (36.4 °C) 97 16 113/71 98 %   09/09/20 2117 98.1 °F (36.7 °C) 77 16 124/77 97 %   09/09/20 1506 98 °F (36.7 °C) (!) 106 16 125/87 97 %     Wt Readings from Last 3 Encounters:   02/21/20 49.4 kg (109 lb)   01/04/19 49.9 kg (110 lb)   12/26/18 50.3 kg (111 lb)     Temp Readings from Last 3 Encounters:   09/10/20 97.6 °F (36.4 °C)   09/09/20 98.2 °F (36.8 °C)   02/21/20 96.3 °F (35.7 °C) (Oral)     BP Readings from Last 3 Encounters:   09/10/20 113/71   09/09/20 119/68   02/21/20 107/72     Pulse Readings from Last 3 Encounters:   09/10/20 97   09/09/20 95   02/21/20 73            DATA     LABORATORY DATA:  Labs Reviewed - No data to display  Admission on 09/08/2020, Discharged on 09/09/2020   Component Date Value Ref Range Status    WBC 09/08/2020 10.1  3.6 - 11.0 K/uL Final    RBC 09/08/2020 4.47  3.80 - 5.20 M/uL Final    HGB 09/08/2020 14.4  11.5 - 16.0 g/dL Final    HCT 09/08/2020 42.5  35.0 - 47.0 % Final    MCV 09/08/2020 95.1  80.0 - 99.0 FL Final    MCH 09/08/2020 32.2  26.0 - 34.0 PG Final    MCHC 09/08/2020 33.9  30.0 - 36.5 g/dL Final    RDW 09/08/2020 11.5  11.5 - 14.5 % Final    PLATELET 42/08/7509 836  150 - 400 K/uL Final    MPV 09/08/2020 10.1  8.9 - 12.9 FL Final    NRBC 09/08/2020 0.0  0  WBC Final    ABSOLUTE NRBC 09/08/2020 0.00  0.00 - 0.01 K/uL Final    NEUTROPHILS 09/08/2020 81* 32 - 75 % Final    LYMPHOCYTES 09/08/2020 15  12 - 49 % Final    MONOCYTES 09/08/2020 4* 5 - 13 % Final    EOSINOPHILS 09/08/2020 0  0 - 7 % Final    BASOPHILS 09/08/2020 0  0 - 1 % Final    IMMATURE GRANULOCYTES 09/08/2020 0  0.0 - 0.5 % Final    ABS. NEUTROPHILS 09/08/2020 8.1* 1.8 - 8.0 K/UL Final    ABS. LYMPHOCYTES 09/08/2020 1.5  0.8 - 3.5 K/UL Final    ABS. MONOCYTES 09/08/2020 0.4  0.0 - 1.0 K/UL Final    ABS. EOSINOPHILS 09/08/2020 0.0  0.0 - 0.4 K/UL Final    ABS. BASOPHILS 09/08/2020 0.0  0.0 - 0.1 K/UL Final    ABS. IMM.  GRANS. 09/08/2020 0.0  0.00 - 0.04 K/UL Final    DF 09/08/2020 AUTOMATED    Final    Sodium 09/08/2020 139  136 - 145 mmol/L Final    Potassium 09/08/2020 3.6  3.5 - 5.1 mmol/L Final    Chloride 09/08/2020 106  97 - 108 mmol/L Final    CO2 09/08/2020 27  21 - 32 mmol/L Final    Anion gap 09/08/2020 6  5 - 15 mmol/L Final    Glucose 09/08/2020 102* 65 - 100 mg/dL Final    BUN 09/08/2020 10  6 - 20 MG/DL Final    Creatinine 09/08/2020 0.82  0.55 - 1.02 MG/DL Final    BUN/Creatinine ratio 09/08/2020 12  12 - 20   Final    GFR est AA 09/08/2020 >60  >60 ml/min/1.73m2 Final    GFR est non-AA 09/08/2020 >60  >60 ml/min/1.73m2 Final    Calcium 09/08/2020 9.9  8.5 - 10.1 MG/DL Final    Bilirubin, total 09/08/2020 0.6  0.2 - 1.0 MG/DL Final    ALT (SGPT) 09/08/2020 20  12 - 78 U/L Final    AST (SGOT) 09/08/2020 18  15 - 37 U/L Final    Alk.  phosphatase 09/08/2020 51  45 - 117 U/L Final    Protein, total 09/08/2020 8.6* 6.4 - 8.2 g/dL Final    Albumin 09/08/2020 5.0  3.5 - 5.0 g/dL Final    Globulin 09/08/2020 3.6  2.0 - 4.0 g/dL Final    A-G Ratio 09/08/2020 1.4  1.1 - 2.2   Final    ALCOHOL(ETHYL),SERUM 09/08/2020 <10  <10 MG/DL Final    Salicylate level 30/16/5233 <1.7* 2.8 - 20.0 MG/DL Final    Acetaminophen level 09/08/2020 <2* 10 - 30 ug/mL Final    Color 09/08/2020 YELLOW/STRAW    Final    Appearance 09/08/2020 CLEAR  CLEAR   Final    Specific gravity 09/08/2020 1.014  1.003 - 1.030   Final    pH (UA) 09/08/2020 6.0  5.0 - 8.0   Final    Protein 09/08/2020 Negative  NEG mg/dL Final    Glucose 09/08/2020 Negative  NEG mg/dL Final    Ketone 09/08/2020 40* NEG mg/dL Final    Bilirubin 09/08/2020 Negative  NEG   Final    Blood 09/08/2020 Negative  NEG   Final    Urobilinogen 09/08/2020 0.2  0.2 - 1.0 EU/dL Final    Nitrites 09/08/2020 Negative  NEG   Final    Leukocyte Esterase 09/08/2020 Negative  NEG   Final    AMPHETAMINES 09/08/2020 Negative  NEG   Final    BARBITURATES 09/08/2020 Negative  NEG   Final    BENZODIAZEPINES 09/08/2020 Negative  NEG   Final    COCAINE 09/08/2020 Negative  NEG   Final    METHADONE 09/08/2020 Negative  NEG   Final    OPIATES 09/08/2020 Negative  NEG   Final    PCP(PHENCYCLIDINE) 09/08/2020 Negative  NEG   Final    THC (TH-CANNABINOL) 09/08/2020 Positive* NEG   Final    Drug screen comment 09/08/2020 (NOTE)   Final    SAMPLES BEING HELD 09/08/2020 SST.GRN. JANEL   Final    COMMENT 09/08/2020 Add-on orders for these samples will be processed based on acceptable specimen integrity and analyte stability, which may vary by analyte. Final    Specimen source 09/08/2020 Nasopharyngeal    Final    Specimen source 09/08/2020 Nasopharyngeal    Final    COVID-19 rapid test 09/08/2020 Not detected  NOTD   Final    Specimen type 09/08/2020 NP Swab    Final    Health status 09/08/2020 Symptomatic Testing    Final    Urine culture hold 09/08/2020 Urine on hold in Microbiology dept for 2 days. If unpreserved urine is submitted, it cannot be used for addtional testing after 24 hours, recollection will be required. Final    Pregnancy test,urine (POC) 09/09/2020 Negative  NEG   Final        RADIOLOGY REPORTS:  Results from Hospital Encounter encounter on 05/02/10   XR WRIST AP LAT OBLIQUE    Narrative Final Report       ICD Codes / Adm. Diagnosis:    /   L WRIST PAIN  Examination:  WRIST MIN 3 VWS L - [id number] - May  2 2010  5:05PM  Accession No:  [id number]  Reason:  REASON: TRAUMA      REPORT:  Three views of the left wrist demonstrates a nondisplaced cortical buckle   fracture dorsal aspect distal left radius. IMPRESSION:  1. Nondisplaced cortical buckle fracture distal left radius. Interpreting/Reading Doctor: Denise Silva ([id number])  Transcribed: n/a on 05/02/2010  Approved: Denise Silva ([id number])  05/02/2010             Distribution:  Attending Doctor: Cayden Prieto Doctor: Travis Suazo        No results found.            MEDICATIONS       ALL MEDICATIONS  Current Facility-Administered Medications   Medication Dose Route Frequency    OLANZapine (ZyPREXA) tablet 2.5 mg  2.5 mg Oral Q6H PRN    haloperidol lactate (HALDOL) injection 2.5 mg  2.5 mg IntraMUSCular Q6H PRN    benztropine (COGENTIN) tablet 0.5 mg  0.5 mg Oral BID PRN    diphenhydrAMINE (BENADRYL) injection 25 mg  25 mg IntraMUSCular BID PRN    acetaminophen (TYLENOL) tablet 650 mg  650 mg Oral Q4H PRN    magnesium hydroxide (MILK OF MAGNESIA) 400 mg/5 mL oral suspension 30 mL  30 mL Oral DAILY PRN    hydrOXYzine HCL (ATARAX) tablet 25 mg  25 mg Oral TID PRN    traZODone (DESYREL) tablet 25 mg  25 mg Oral QHS PRN    LORazepam (ATIVAN) injection 0.5 mg  0.5 mg IntraMUSCular Q4H PRN      SCHEDULED MEDICATIONS  Current Facility-Administered Medications   Medication Dose Route Frequency                ASSESSMENT & PLAN        The patient, Tobin Jefferson, is a 26 y.o.  female who presents at this time for treatment of the following diagnoses:  Patient Active Hospital Problem List:   Bipolar 1 disorder ([facility] [address] [facility] (9/10/2020)    Assessment: patient with history most consistent with bipolar depression, positive family history and significant risk factors for self harm. No psychotic features, good insight. Will start mood stabilizer with activity against suicide, observe and obtain collateral.    Plan:   - START lithium 300 mg Q12H for mood lability  - TRANSFER to general unit  - Observe off substances  - IGM therapy as tolerated  - Expand database / obtain collateral  - Dispo planning    I will continue to monitor blood levels (lithium---a drug with a narrow therapeutic index= NTI) and associated labs for drug therapy implemented that require intense monitoring for toxicity as deemed appropriate based on current medication side effects and pharmacodynamically determined drug 1/2 lives. A coordinated, multidisplinary treatment team (includes the nurse, unit pharmacist,  and writer) round was conducted for this initial evaluation with the patient present. The following regarding medications was addressed during rounds with patient: the risks and benefits of the proposed medication. The patient was given the opportunity to ask questions.  Informed consent given to the use of the above medications. I will continue to adjust psychiatric and non-psychiatric medications (see above \"medication\" section and orders section for details) as deemed appropriate & based upon diagnoses and response to treatment. I have reviewed admission (and previous/old) labs and medical tests in the EHR and or transferring hospital documents. I will continue to order blood tests/labs and diagnostic tests as deemed appropriate and review results as they become available (see orders for details). I have reviewed old psychiatric and medical records available in the EHR. I Will order additional psychiatric records from other institutions to further elucidate the nature of patient's psychopathology and review once available. I will gather additional collateral information from friends, family and o/p treatment team to further elucidate the nature of patient's psychopathology and baselline level of psychiatric functioning.       ESTIMATED LENGTH OF STAY:  5-7 days       STRENGTHS:  Exercising self-direction/Resourceful, Access to housing/residential stability and Interpersonal/supportive relationships (family, friends, peers)                                        SIGNED:    Sandra Hunter MD  9/10/2020

## 2020-09-11 LAB
CHOLEST SERPL-MCNC: 172 MG/DL
EST. AVERAGE GLUCOSE BLD GHB EST-MCNC: 91 MG/DL
FOLATE SERPL-MCNC: 19.7 NG/ML (ref 5–21)
HBA1C MFR BLD: 4.8 % (ref 4–5.6)
HDLC SERPL-MCNC: 62 MG/DL
HDLC SERPL: 2.8 {RATIO} (ref 0–5)
IRON SATN MFR SERPL: 15 % (ref 20–50)
IRON SERPL-MCNC: 44 UG/DL (ref 35–150)
LDLC SERPL CALC-MCNC: 92.8 MG/DL (ref 0–100)
LIPID PROFILE,FLP: NORMAL
MAGNESIUM SERPL-MCNC: 2.1 MG/DL (ref 1.6–2.4)
TIBC SERPL-MCNC: 290 UG/DL (ref 250–450)
TRIGL SERPL-MCNC: 86 MG/DL (ref ?–150)
TSH SERPL DL<=0.05 MIU/L-ACNC: 1.15 UIU/ML (ref 0.36–3.74)
VIT B12 SERPL-MCNC: 464 PG/ML (ref 193–986)
VLDLC SERPL CALC-MCNC: 17.2 MG/DL

## 2020-09-11 PROCEDURE — 82607 VITAMIN B-12: CPT

## 2020-09-11 PROCEDURE — 82746 ASSAY OF FOLIC ACID SERUM: CPT

## 2020-09-11 PROCEDURE — 74011250637 HC RX REV CODE- 250/637: Performed by: NURSE PRACTITIONER

## 2020-09-11 PROCEDURE — 84443 ASSAY THYROID STIM HORMONE: CPT

## 2020-09-11 PROCEDURE — 80061 LIPID PANEL: CPT

## 2020-09-11 PROCEDURE — 65220000003 HC RM SEMIPRIVATE PSYCH

## 2020-09-11 PROCEDURE — 36415 COLL VENOUS BLD VENIPUNCTURE: CPT

## 2020-09-11 PROCEDURE — 99231 SBSQ HOSP IP/OBS SF/LOW 25: CPT | Performed by: PSYCHIATRY & NEUROLOGY

## 2020-09-11 PROCEDURE — 83735 ASSAY OF MAGNESIUM: CPT

## 2020-09-11 PROCEDURE — 83036 HEMOGLOBIN GLYCOSYLATED A1C: CPT

## 2020-09-11 PROCEDURE — 83540 ASSAY OF IRON: CPT

## 2020-09-11 PROCEDURE — 74011250637 HC RX REV CODE- 250/637: Performed by: PSYCHIATRY & NEUROLOGY

## 2020-09-11 RX ADMIN — HYDROXYZINE HYDROCHLORIDE 25 MG: 25 TABLET ORAL at 14:12

## 2020-09-11 RX ADMIN — LITHIUM CARBONATE 300 MG: 300 TABLET, FILM COATED, EXTENDED RELEASE ORAL at 21:55

## 2020-09-11 RX ADMIN — HYDROXYZINE HYDROCHLORIDE 25 MG: 25 TABLET ORAL at 21:55

## 2020-09-11 RX ADMIN — LITHIUM CARBONATE 300 MG: 300 TABLET, FILM COATED, EXTENDED RELEASE ORAL at 08:18

## 2020-09-11 RX ADMIN — TRAZODONE HYDROCHLORIDE 25 MG: 50 TABLET ORAL at 21:55

## 2020-09-11 NOTE — PROGRESS NOTES
Problem: Discharge Planning  Goal: *Discharge to safe environment  Outcome: Progressing Towards Goal  Note: Patient identifies home as a safe environment. Patient will return home upon discharge. Goal: *Knowledge of medication management  Outcome: Progressing Towards Goal  Note: Patient verbalizes understanding of medication regimen. Patient is taking medications as prescribed. Goal: *Knowledge of discharge instructions  Outcome: Progressing Towards Goal  Note:     Patient verbalizes understanding of goals for treatment and safe discharge.

## 2020-09-11 NOTE — GROUP NOTE
LEON  GROUP DOCUMENTATION INDIVIDUAL Group Therapy Note Date: 9/11/2020 Group Start Time: 1000 Group End Time: 1100 Group Topic: Topic Group [facility] [place] [facility] ACUTE BE[facility] [address], Susan. \Hospital Has a New Name and Outlook.\"" GROUP DOCUMENTATION GROUP Group Therapy Note Attendees: 4 Attendance: Did not attend Patient's Goal: Interventions/techniques: 
 
Ruben Gutierres

## 2020-09-11 NOTE — BH NOTES
Behavioral Health Interdisciplinary Rounds     Patient Name: Carrie Fernandez  Age: 26 y.o. Room/Bed:  319/01  Primary Diagnosis: Bipolar I disorder, most recent episode mixed, severe without psychotic features [facility]   Admission Status: TDO     Readmission within 30 days:   Power of  in place:   Patient requires a blocked bed: no            Reason for blocked bed:   Order for blocked bed obtained:        Sleep hours: 8   Morning Labs completed per orders:  yes       Participation in Care/Groups:  yes  Medication Compliant?: Yes  PRNS (last 24 hours): Antianxiety and Sleep Aid    Restraints (last 24 hours):  no  Substance Abuse:  no    24 hour chart check complete: yes       Patient goal(s) for today: Continue taking medication as prescribed. Treatment team focus/goals: Stabilize on medication. Progress note: Pt's rahul is depressed, tearful, full range affect. Pt's thought process was logical and pt was educated in bipolar depression mixed episode symptoms and treatment. Dr. Sussy Jones approved in person visits from mother Janelle Redmond at 2:30PM and  Mr. Ford at 5:30PM today. LOS:  2  Expected LOS: TBD    Financial concerns/prescription coverage: [facility] [employer]  Family contact: Aleja 9/11/2020  Family requesting physician contact today:  No  Discharge plan: Home   Access to weapons: None involved. Outpatient provider(s): To be linked. Patient's preferred phone number for follow up call:     Participating treatment team members: Kristy Baxter MSW and Dr. Sussy Jones.

## 2020-09-11 NOTE — BH NOTES
PSYCHIATRIC PROGRESS NOTE         Patient Name  Joan Agrawal   Date of Birth 1995   [facility] [id number]   Medical Record Number  [id number]      Age  26 y.o. PCP Teresa Aldridge MD   Admit date:  9/9/2020    Room Number  319/01  @ [facility]   Date of Service  9/11/2020         E & M PROGRESS NOTE:         HISTORY       CC:  \"suicidal ideation\"  HISTORY OF PRESENT ILLNESS/INTERVAL HISTORY:  (reviewed/updated 9/11/2020). per initial evaluation: The patient, Joan Agrawal, is a 26 y.o. WHITE OR  female with a past psychiatric history significant for generalized anxiety disorder and potential bipolar disorder, who presents at this time with complaints of (and/or evidence of) the following emotional symptoms: depression and suicidal thoughts/threats. Additional symptomatology include increased interpersonal stressors. The above symptoms have been present for 2+ weeks. These symptoms are of moderate to high severity. These symptoms are constant in nature. The patient's condition has been precipitated by psychosocial stressors. Patient's condition made worse by continued illicit drug use as well as treatment noncompliance. UDS: +THC; BAL=0.      Per admission paperwork, patient has been more distressed due to various familial stressors, including infidelity and recently failing an exam for her job. She describes SI, poor concentration, risky behavior and increased marijuana. The patient is a fair historian. The patient corroborates the above narrative. The patient contracts for safety on the unit and gives consent for the team to contact collateral. The patient is amenable to initiating treatment while on the unit. After a discussion of risk and benefit, patient agrees with starting lithium. 09/11 - the patient reports ongoing GI issues, stating that she had both diarrhea and vomiting this morning. She denies SI/HI/AVH/PI.  She continues to be emotionally labile, tearful and confused about her current mental state but cooperative with assessments; she has many questions she wrote down but did not bring this into the session. Patient amenable to continuing Li++ trial. Psychoeducation provided regarding cannabis use, she agrees to abstinence upon discharge. Discussed visitation, mom and  will be allowed to visit by appointment. SIDE EFFECTS: (reviewed/updated 9/11/2020)  + diarrhea, NBNB emesis  No noted toxicity with use of lithium   ALLERGIES:(reviewed/updated 9/11/2020)  Allergies   Allergen Reactions    Latex Hives      MEDICATIONS PRIOR TO ADMISSION:(reviewed/updated 9/11/2020)  Medications Prior to Admission   Medication Sig    albuterol (PROVENTIL HFA, VENTOLIN HFA, PROAIR HFA) 90 mcg/actuation inhaler Take 1 Puff by inhalation every six (6) hours as needed for Wheezing or Shortness of Breath.  levonorgestrel (KYLEENA IU) by IntraUTERine route. Every 5 years - placed in February 2020      PAST MEDICAL HISTORY: Past medical history from the initial psychiatric evaluation has been reviewed (reviewed/updated 9/11/2020) with no additional updates (I asked patient and no additional past medical history provided). No past medical history on file. Past Surgical History:   Procedure Laterality Date    HX TONSIL AND ADENOIDECTOMY      HX TONSILLECTOMY  2003      SOCIAL HISTORY: Social history from the initial psychiatric evaluation has been reviewed (reviewed/updated 9/11/2020) with no additional updates (I asked patient and no additional social history provided).  Social History     Socioeconomic History    Marital status: SINGLE     Spouse name: Not on file    Number of children: Not on file    Years of education: Not on file    Highest education level: Not on file   Occupational History    Occupation: college student   Social Needs    Financial resource strain: Not on file    Food insecurity     Worry: Not on file     Inability: Not on file   [employer] needs Medical: Not on file     Non-medical: Not on file   Tobacco Use    Smoking status: Never Smoker    Smokeless tobacco: Never Used   Substance and Sexual Activity    Alcohol use: Yes     Alcohol/week: 0.0 standard drinks     Comment: occasionally    Drug use: No    Sexual activity: Yes     Partners: Male     Birth control/protection: Pill   Lifestyle    Physical activity     Days per week: Not on file     Minutes per session: Not on file    Stress: Not on file   Relationships    Social connections     Talks on phone: Not on file     Gets together: Not on file     Attends Nondenominational service: Not on file     Active member of club or organization: Not on file     Attends meetings of clubs or organizations: Not on file     Relationship status: Not on file    Intimate partner violence     Fear of current or ex partner: Not on file     Emotionally abused: Not on file     Physically abused: Not on file     Forced sexual activity: Not on file   Other Topics Concern    Not on file   Social History Narrative    Not on file      FAMILY HISTORY: Family history from the initial psychiatric evaluation has been reviewed (reviewed/updated 9/11/2020) with no additional updates (I asked patient and no additional family history provided). No family history on file. REVIEW OF SYSTEMS: (reviewed/updated 9/11/2020)  Appetite:no change from normal   Sleep: good   All other Review of Systems: Negative except nausea per HPI         [facility] (MSE):    MSE FINDINGS ARE WITHIN NORMAL LIMITS (WNL) UNLESS OTHERWISE STATED BELOW. ( ALL OF THE BELOW CATEGORIES OF THE MSE HAVE BEEN REVIEWED (reviewed 9/11/2020) AND UPDATED AS DEEMED APPROPRIATE )  General Presentation age appropriate, cooperative   Orientation confused   Vital Signs  See below (reviewed 9/11/2020); Vital Signs (BP, Pulse, & Temp) are within normal limits if not listed below.    Gait and Station Stable/steady, no ataxia Musculoskeletal System No extrapyramidal symptoms (EPS); no abnormal muscular movements or Tardive Dyskinesia (TD); muscle strength and tone are within normal limits   Language No aphasia or dysarthria   Speech:  normal volume and non-pressured   Thought Processes coherent; normal rate of thoughts; fair abstract reasoning/computation   Thought Associations goal directed   Thought Content internally preoccupied   Suicidal Ideations contracts for safety   Homicidal Ideations none   Mood:  anxious  and depressed   Affect:  labile   Memory recent  intact   Memory remote:  intact   Concentration/Attention:  intact   Fund of Knowledge average   Insight:  good   Reliability good   Judgment:  fair          VITALS:     Patient Vitals for the past 24 hrs:   Temp Pulse Resp BP SpO2   09/11/20 0720 98.2 °F (36.8 °C) 72 16 106/81 100 %   09/10/20 1957 98.3 °F (36.8 °C) 87 14 118/89 99 %     Wt Readings from Last 3 Encounters:   09/10/20 49.5 kg (109 lb 1.6 oz)   02/21/20 49.4 kg (109 lb)   01/04/19 49.9 kg (110 lb)     Temp Readings from Last 3 Encounters:   09/11/20 98.2 °F (36.8 °C)   09/09/20 98.2 °F (36.8 °C)   02/21/20 96.3 °F (35.7 °C) (Oral)     BP Readings from Last 3 Encounters:   09/11/20 106/81   09/09/20 119/68   02/21/20 107/72     Pulse Readings from Last 3 Encounters:   09/11/20 72   09/09/20 95   02/21/20 73            DATA     LABORATORY DATA:(reviewed/updated 9/11/2020)  Recent Results (from the past 24 hour(s))   LIPID PANEL    Collection Time: 09/11/20  5:37 AM   Result Value Ref Range    LIPID PROFILE          Cholesterol, total 172 <200 MG/DL    Triglyceride 86 <150 MG/DL    HDL Cholesterol 62 MG/DL    LDL, calculated 92.8 0 - 100 MG/DL    VLDL, calculated 17.2 MG/DL    CHOL/HDL Ratio 2.8 0.0 - 5.0     HEMOGLOBIN A1C WITH EAG    Collection Time: 09/11/20  5:37 AM   Result Value Ref Range    Hemoglobin A1c 4.8 4.0 - 5.6 %    Est. average glucose 91 mg/dL   TSH 3RD GENERATION    Collection Time: 09/11/20 5: 37 AM   Result Value Ref Range    TSH 1.15 0.36 - 3.74 uIU/mL   VITAMIN B12    Collection Time: 09/11/20  5:37 AM   Result Value Ref Range    Vitamin B12 464 193 - 986 pg/mL   IRON PROFILE    Collection Time: 09/11/20  5:37 AM   Result Value Ref Range    Iron 44 35 - 150 ug/dL    TIBC 290 250 - 450 ug/dL    Iron % saturation 15 (L) 20 - 50 %   FOLATE    Collection Time: 09/11/20  5:37 AM   Result Value Ref Range    Folate 19.7 5.0 - 21.0 ng/mL   MAGNESIUM    Collection Time: 09/11/20  5:37 AM   Result Value Ref Range    Magnesium 2.1 1.6 - 2.4 mg/dL     No results found for: VALF2, VALAC, VALP, VALPR, DS6, CRBAM, CRBAMP, CARB2, XCRBAM  No results found for: LITHM   RADIOLOGY REPORTS:(reviewed/updated 9/11/2020)  No results found.        MEDICATIONS     ALL MEDICATIONS:   Current Facility-Administered Medications   Medication Dose Route Frequency    lithium carbonate SR (LITHOBID) tablet 300 mg  300 mg Oral Q12H    albuterol (PROVENTIL HFA, VENTOLIN HFA, PROAIR HFA) inhaler 1 Puff  1 Puff Inhalation Q6H PRN    loperamide (IMODIUM) capsule 2 mg  2 mg Oral QID PRN    OLANZapine (ZyPREXA) tablet 2.5 mg  2.5 mg Oral Q6H PRN    haloperidol lactate (HALDOL) injection 2.5 mg  2.5 mg IntraMUSCular Q6H PRN    benztropine (COGENTIN) tablet 0.5 mg  0.5 mg Oral BID PRN    diphenhydrAMINE (BENADRYL) injection 25 mg  25 mg IntraMUSCular BID PRN    acetaminophen (TYLENOL) tablet 650 mg  650 mg Oral Q4H PRN    magnesium hydroxide (MILK OF MAGNESIA) 400 mg/5 mL oral suspension 30 mL  30 mL Oral DAILY PRN    hydrOXYzine HCL (ATARAX) tablet 25 mg  25 mg Oral TID PRN    traZODone (DESYREL) tablet 25 mg  25 mg Oral QHS PRN    LORazepam (ATIVAN) injection 0.5 mg  0.5 mg IntraMUSCular Q4H PRN      SCHEDULED MEDICATIONS:   Current Facility-Administered Medications   Medication Dose Route Frequency    lithium carbonate SR (LITHOBID) tablet 300 mg  300 mg Oral Q12H          ASSESSMENT & PLAN     DIAGNOSES REQUIRING ACTIVE TREATMENT AND MONITORING: (reviewed/updated 9/11/2020)  Patient Active Hospital Problem List:  Bipolar 1 disorder ([facility] (9/10/2020)    Assessment: patient with history most consistent with bipolar depression, positive family history and significant risk factors for self harm. No psychotic features, good insight. Will start mood stabilizer with activity against suicide, observe and obtain collateral.    Plan:   - CONTINUE lithium 300 mg Q12H for mood lability  - Li level 9/14/20  - TRANSFER to general unit  - TSH Mg A1C lipid WNL  - Encourage nutritional suppl. - Observe off substances  - IGM therapy as tolerated  - Expand database / obtain collateral  - Dispo planning     I will continue to monitor blood levels (lithium---a drug with a narrow therapeutic index= NTI) and associated labs for drug therapy implemented that require intense monitoring for toxicity as deemed appropriate based on current medication side effects and pharmacodynamically determined drug 1/2 lives. In summary, Vinod Ring, is a 26 y.o.  female who presents with a severe exacerbation of the principal diagnosis of Bipolar I disorder, most recent episode mixed, severe without psychotic features ([facility]    Patient's condition is not improving. Patient requires continued inpatient hospitalization for further stabilization, safety monitoring and medication management. I will continue to coordinate the provision of individual, milieu, occupational, group, and substance abuse therapies to address target symptoms/diagnoses as deemed appropriate for the individual patient. A coordinated, multidisplinary treatment team round was conducted with the patient (this team consists of the nurse, psychiatric unit pharmacist,  and writer). Complete current electronic health record for patient has been reviewed today including consultant notes, ancillary staff notes, nurses and psychiatric tech notes.     Suicide risk assessment completed and patient deemed to be of high risk for suicide at this time. The following regarding medications was addressed during rounds with patient:   the risks and benefits of the proposed medication. The patient was given the opportunity to ask questions. Informed consent given to the use of the above medications. Will continue to adjust psychiatric and non-psychiatric medications (see above \"medication\" section and orders section for details) as deemed appropriate & based upon diagnoses and response to treatment. I will continue to order blood tests/labs and diagnostic tests as deemed appropriate and review results as they become available (see orders for details and above listed lab/test results). I will order psychiatric records from previous [facility] hospitals to further elucidate the nature of patient's psychopathology and review once available. I will gather additional collateral information from friends, family and o/p treatment team to further elucidate the nature of patient's psychopathology and baselline level of psychiatric functioning. I certify that this patient's inpatient psychiatric hospital services furnished since the previous certification were, and continue to be, required for treatment that could reasonably be expected to improve the patient's condition, or for diagnostic study, and that the patient continues to need, on a daily basis, active treatment furnished directly by or requiring the supervision of inpatient psychiatric facility personnel. In addition, the hospital records show that services furnished were intensive treatment services, admission or related services, or equivalent services.     EXPECTED DISCHARGE DATE/DAY: 9/14/20     DISPOSITION: Home       Signed By:   Ketan Murray MD  9/11/2020

## 2020-09-11 NOTE — PROGRESS NOTES
Comprehensive Nutrition Assessment    Type and Reason for Visit: Initial, Consult, Positive nutrition screen    Nutrition Recommendations/Plan:   · Continue regular diet as ordered. · Continue Ensure shake po once daily with lunch meal.  · Please document % meals and supplements consumed in flowsheet I/O's under intake. Nutrition Assessment:     9/11:  Chart reviewed; med noted for admission secondary to anxiety/dpression, suicidal thoughts and possible bipolar disorder. MST trigger for mild weight loss 2-13 lbs. MD also placed a nutrition consult per unintentional weight loss. Ensure Enlive shakes have been started. Per documented weight trends, indicates mostly stable weight trends. May 2016: 107 lbs, current Sept 2020: 107 lbs. Pt's weight trends stable at +/- 5 lbs. Recent mild weight loss likely directly related to depression/anxiety 2' to recent events loading up to admission. No data found. Last Weight Metric  Weight Loss Metrics 9/10/2020 2/21/2020 1/4/2019 12/26/2018 5/23/2017 5/9/2016   Today's Wt 109 lb 1.6 oz 109 lb 110 lb 111 lb 109 lb 107 lb   BMI 18.16 kg/m2 17.86 kg/m2 18.03 kg/m2 18.19 kg/m2 17.86 kg/m2 17.53 kg/m2     Estimated Daily Nutrient Needs:  Energy (kcal):  1868 (BMR 1245 x 1.3AF ) + 250 kcals  Protein (g):  59 (1.2 g/kg bw)       Fluid (ml/day):  1900 ml/day    Nutrition Related Findings:  Meds: haldol, ativan, milk of mag, Labs; WNL, BM 9/4      Wounds:    None       Current Nutrition Therapies:   DIET REGULAR  DIET NUTRITIONAL SUPPLEMENTS All Meals;  Ensure Clear    Anthropometric Measures:  · Height:  5' 5\" (165.1 cm)  · Current Body Wt:  49.5 kg (109 lb 2 oz)   · Ideal Body Wt:  125 lbs:  87.3 %   · BMI Category:  Underweight (BMI less than 18.5)       Nutrition Diagnosis:   · Inadequate protein-energy intake related to (decreased appetite secondary to anxiety/depression) as evidenced by (suspected weight loss per nutrition consult, ONS added)    Nutrition Interventions:   Food and/or Nutrient Delivery: Continue current diet, Continue oral nutrition supplement  Nutrition Education and Counseling: No recommendations at this time  Coordination of Nutrition Care: Continued inpatient monitoring    Goals:  PO intake >50% of meals + 240 ml ONS next 3-5 days       Nutrition Monitoring and Evaluation:   Food/Nutrient Intake Outcomes: Food and nutrient intake, Supplement intake  Physical Signs/Symptoms Outcomes: Biochemical data, Weight    Discharge Planning:    Continue oral nutrition supplement, Continue current diet     Electronically signed by Lucio Marlow RD on 9/11/2020 at 10:57 AM

## 2020-09-11 NOTE — PROGRESS NOTES
Problem: Suicide  Goal: *STG: Remains safe in hospital  Outcome: Progressing Towards Goal     Problem: Suicide  Goal: *STG/LTG: Complies with medication therapy  Outcome: Progressing Towards Goal

## 2020-09-11 NOTE — GROUP NOTE
LEON  GROUP DOCUMENTATION INDIVIDUAL Group Therapy Note Date: 9/11/2020 Group Start Time: 1400 Group End Time: 1500 Group Topic: Recreational/Music Therapy [facility] [place] [id number] ACUTE BEHAV [facility], [address] GROUP DOCUMENTATION GROUP Group Therapy Note Attendees: 6 Attendance: Attended Patient's Goal:  To concentrate on selected task Interventions/techniques: Supported-crafts,games,music Follows Directions: Followed directions Interactions: Interacted appropriately Mental Status: Calm Behavior/appearance: Attentive, Cooperative and Needed prompting Goals Achieved: Able to engage in interactions and Able to listen to others Additional Notes:  Pleasant-socialized with peers-good concentration on selected task Sb Cedeño

## 2020-09-11 NOTE — BH NOTES
Dr. Karen Ramos approved in person visits from mother Jaiden Jay at 2:30PM and  Mr. Ford at 5:30PM today.

## 2020-09-12 PROCEDURE — 65220000003 HC RM SEMIPRIVATE PSYCH

## 2020-09-12 PROCEDURE — 74011250637 HC RX REV CODE- 250/637: Performed by: NURSE PRACTITIONER

## 2020-09-12 PROCEDURE — 74011250637 HC RX REV CODE- 250/637: Performed by: PSYCHIATRY & NEUROLOGY

## 2020-09-12 RX ORDER — BUTALBITAL, ACETAMINOPHEN AND CAFFEINE 50; 325; 40 MG/1; MG/1; MG/1
1 TABLET ORAL
Status: DISCONTINUED | OUTPATIENT
Start: 2020-09-12 | End: 2020-09-14 | Stop reason: HOSPADM

## 2020-09-12 RX ORDER — ACETAMINOPHEN 325 MG/1
650 TABLET ORAL
Status: DISCONTINUED | OUTPATIENT
Start: 2020-09-12 | End: 2020-09-14 | Stop reason: HOSPADM

## 2020-09-12 RX ADMIN — TRAZODONE HYDROCHLORIDE 25 MG: 50 TABLET ORAL at 22:10

## 2020-09-12 RX ADMIN — HYDROXYZINE HYDROCHLORIDE 25 MG: 25 TABLET ORAL at 16:42

## 2020-09-12 RX ADMIN — BUTALBITAL, ACETAMINOPHEN, AND CAFFEINE 1 TABLET: 50; 325; 40 TABLET, COATED ORAL at 14:10

## 2020-09-12 RX ADMIN — HYDROXYZINE HYDROCHLORIDE 25 MG: 25 TABLET ORAL at 08:44

## 2020-09-12 RX ADMIN — ACETAMINOPHEN 650 MG: 325 TABLET, FILM COATED ORAL at 08:44

## 2020-09-12 RX ADMIN — LITHIUM CARBONATE 300 MG: 300 TABLET, FILM COATED, EXTENDED RELEASE ORAL at 22:07

## 2020-09-12 RX ADMIN — LITHIUM CARBONATE 300 MG: 300 TABLET, FILM COATED, EXTENDED RELEASE ORAL at 08:44

## 2020-09-12 NOTE — PROGRESS NOTES
1630  I received a call from the patients mother Rich Rivera that they had come to the facility to visit with their daughter on the U an when they got here they were not allowed to go up. They state that they had special privileges ordered by the doctor for weekend visitation and since we don't have any weekend services he would allow them to come for visitation. The staff on the unit state it was a one time event, yesterday and that it is not possible for the patient to have visitors today. I called the [facility] director and [facility] [place] [facility]. I went to the patient and I spoke with her about the situation and we discussed that she does not feel that this facility is the right place for her and that she can hear all of the conversations at the nurses station. We discussed a zoom meeting with her family and she was agreeable to that.

## 2020-09-12 NOTE — GROUP NOTE
IP  GROUP DOCUMENTATION INDIVIDUAL Group Therapy Note Date: 9/12/2020 Group Start Time: 3949 Group End Time: 8479 Group Topic: Process Group - Inpatient [address] Claudia Pond RN 
 
[facility] GROUP DOCUMENTATION GROUP Group Therapy Note Attendees:8 Attendance: Attended Patient's Goal:  Identify issues and solutions Interventions/techniques: Informed Follows Directions: Followed directions Interactions: Interacted appropriately Mental Status: Calm Behavior/appearance: Cooperative Goals Achieved: Able to engage in interactions, Able to listen to others, Displayed empathy, Identified feelings and Identified triggers Additional Notes:  N/A Chio Bullard RN

## 2020-09-12 NOTE — BH NOTES
Weekend Coverage:  CC:\"Im doing better\"  Nelsy reports that she feels better overall. She reports a stable mood though acknowledges that her anxiety has increased overall. She reports a better appetite though continues to struggle with GI symptoms such as nausea and diarrhea. Any reports a headache that she has had x two days. She reports a hx of headaches that have been increasing in frequency, she correlates this to anxiety.   Sleep:Fair  Appetite:Improving  Incidents in last 24 hours:None  PRNs:Hydroxyzine, Trazodone  Thought process:WNL  Thought Content:Denies SI/HI/AVH    Plan: Fioricet for headache, decrease frequency of tylenol, continue other treatment

## 2020-09-12 NOTE — PROGRESS NOTES
Patient's mother called from [facility] requesting to come for a visit with patient. Per current guidelines, visits must be pre-arranged with MD.  Patient's chart with note reflecting permission for patient's mother to visit with patient yesterday but not further documentation from Dr. Donis Bernal regarding further visits. Patient's mother informed of current guidelines and informed that I would contact patient's doctor, Dr. Donis Bernal to ask about visit for today. Patient's mother stated that Dr. Donis Bernal gave permission for patient to have her mother visit each day this weekend due to limited therapy groups over the weekend. I spoke with Dr. Donis Bernal and was told that patient's mother was informed of need to call prior to coming to hospital and confirm with nursing staff if a visit was possible. Patient's mother was informed of instruction received from Dr. Donis Bernal and patient's mother stated that was not the information she received and stated she had recorded conversation with Dr. Donis Bernal and could provide evidence of conversation via recording. Given current status on unit, nursing staff is unable to accommodate a visit at this time. Per Dr. Jaymie Castro instruction, it is at the discretion of nursing staff whether or not patient's mother visits. Patient's mother upset after being informed of inability to visit patient today.

## 2020-09-12 NOTE — BH NOTES
Assumed care of the patient. Patient was visible in the milieu. But she kept to herself. Patient appeared pleasant on approach and was cooperative with the assessments. Patient stated that she was feeling \"All right. \" She denied S.I/H. I/A/V/H and confirmed some  depression and anxiety. She said, \"Anxiety is worse than anything else and I am not as depressed as I was before. \" Patient was medicine and meal compliant. She received PRN PO Atarax for anxiety and Trazodone for insomnia at 2155. Will continue to monitor. Patient slept for about 7 hours.

## 2020-09-13 PROCEDURE — 65220000003 HC RM SEMIPRIVATE PSYCH

## 2020-09-13 PROCEDURE — 74011250637 HC RX REV CODE- 250/637: Performed by: NURSE PRACTITIONER

## 2020-09-13 PROCEDURE — 74011250637 HC RX REV CODE- 250/637: Performed by: PSYCHIATRY & NEUROLOGY

## 2020-09-13 RX ORDER — MIRTAZAPINE 15 MG/1
7.5 TABLET, FILM COATED ORAL
Status: DISCONTINUED | OUTPATIENT
Start: 2020-09-13 | End: 2020-09-14

## 2020-09-13 RX ADMIN — MIRTAZAPINE 7.5 MG: 15 TABLET, FILM COATED ORAL at 22:03

## 2020-09-13 RX ADMIN — HYDROXYZINE HYDROCHLORIDE 25 MG: 25 TABLET ORAL at 22:03

## 2020-09-13 RX ADMIN — HYDROXYZINE HYDROCHLORIDE 25 MG: 25 TABLET ORAL at 09:25

## 2020-09-13 RX ADMIN — LITHIUM CARBONATE 300 MG: 300 TABLET, FILM COATED, EXTENDED RELEASE ORAL at 08:07

## 2020-09-13 RX ADMIN — ACETAMINOPHEN 650 MG: 325 TABLET, FILM COATED ORAL at 12:42

## 2020-09-13 RX ADMIN — LITHIUM CARBONATE 300 MG: 300 TABLET, FILM COATED, EXTENDED RELEASE ORAL at 22:03

## 2020-09-13 NOTE — BH NOTES
Report received form off going nurse, assumed care of resident. Resident is alert and oriented x 4 She is calm, pleasant, and cooperative. Smiling during conversation and interactions. She denies that she is having any feelings of homicide or suicide. She also denies AVH. Resident confirms feelings of anxiety but denies feelings of depression at this time. When asked about pain she reported having abdominal pain and stated that she had a small amount of blood in her stool. She was told not to flush at the next occurrence of bleeding and to notify this  so that the actual amount of blood can be seen. NP Erich Watters was notified of reported blood loss in stool. No new orders received. Resident refused her breakfast tray and requested an ensure clear which was provided for nutritional supplement. There are no other issues to note at this time. Will continue to monitor for changes in condition.

## 2020-09-13 NOTE — PROGRESS NOTES
Problem: Suicide  Goal: *STG: Remains safe in hospital  Outcome: Progressing Towards Goal  Goal: *STG/LTG: Complies with medication therapy  9/12/2020 2036 by Artist Poag  Outcome: Progressing Towards Goal  9/12/2020 2036 by Artist Poag  Outcome: Progressing Towards Goal  Goal: *STG/LTG: No longer expresses self destructive or suicidal thoughts  Outcome: Progressing Towards Goal     Client presents with tearful affect and anxious and depressed mood. She is cooperative with meals and medications. She complained of a headache 6/10 at 0844 for which she was given PRN tylenol. When this was not effective KAYLA Smith ordered PRN fiorcet which was given with good effect. She also complained of anxiety at 0845 and 1642 for which she received atarax which was effective within the hour. She became upset when her mother came to visit, but learned that visitors were not allowed today. She asked to be discharged. KAYLA Smith notified and [facility] crisis called. [facility] Ba[facility]uel[facility] met with her and said if she were to leave it was likely she would be TDO'd. Nelsy agreed to stay voluntarily. She had a zoom visitation with her family. She denies SI HI or AVH and CFS on the unit.

## 2020-09-13 NOTE — PROGRESS NOTES
Patients mother called at 47 this morning to set up a visitation with her daughter. I notified the charge nurse and asked her if she could have a visitor based on the information I was given.   She states she needs to check with the director and let the patients mother Karey Frankel know- I left her number at the nurses station  And in this note:  Karey Frankel- patients mother [phone]

## 2020-09-13 NOTE — BH NOTES
Resident had a visit with her mother in the conference room which lasted for about an hour and 15 minutes. There were no issues noted during the visit. Will continue to monitor.

## 2020-09-13 NOTE — BH NOTES
Weekend Coverage:  CC:\"Im okay\"  Nelsy reports that she experienced a lot of anxiety yesterday. She reports that she had a couple of panic attacks. In addition, she reports that she did not sleep well. She states that she had nightmares last night, what she describes as \"night terrors\". Nelsy was prescreened by [facility] yesterday though decided to stay voluntarily.   Sleep:Poor  Appetite:Improving  Incidents in last 24 hours:None  PRNs:Hydroxyzine, Trazodone  Thought process:WNL  Thought Content:Denies SI/HI/AVH    Plan:Start Remeron, continue other treatment           Prescreened 9/12

## 2020-09-13 NOTE — GROUP NOTE
IP  GROUP DOCUMENTATION INDIVIDUAL Group Therapy Note Date: 9/13/2020 Group Start Time: 6921 Group End Time: 7405 Group Topic: Nursing [facility] [address] Eron Barajas RN 
 
[facility] GROUP DOCUMENTATION GROUP Group Therapy Note Attendees: 10 Attendance: Did not attend Patient's Goal:  NA Interventions/techniques: NA Follows Directions: NA Interactions: NA Mental Status: NA Behavior/appearance: NA 
 
Goals Achieved: NA Additional Notes: Pt did not attend, visiting with family.  
 
Chloé Rodriguez RN

## 2020-09-13 NOTE — GROUP NOTE
IP  GROUP DOCUMENTATION INDIVIDUAL Group Therapy Note Date: 9/12/2020 Group Start Time: 2030 Group End Time: 2130 Group Topic: Reflection/Relaxation [address] Renard Victor RN 
 
[facility] GROUP DOCUMENTATION GROUP Group Therapy Note Attendees: There were 7 people that attended and only 5 people participated in group therapy Attendance: Attended Patient's Goal:  To reduce stress and anxiety with deep breathing exercises and Aromatherapy Interventions/techniques: Art integration, Promoted peer support, Provide feedback, Reinforced and Supported Follows Directions: Followed directions Interactions: Interacted appropriately Mental Status: Happy Behavior/appearance: Cooperative Goals Achieved: Able to engage in interactions, Able to listen to others, Able to give feedback to another, Able to reflect/comment on own behavior, Able to manage/cope with feelings, Able to receive feedback and Able to experience relief/decrease in symptoms Additional Notes:   
 
Shaggy Mcmahon RN

## 2020-09-13 NOTE — BH NOTES
Resident spoke with NP MARCK Watters at length on the phone. Resident expressed concerns about nightmares and issues sleeping and night time medications. She reported that she had a regular bowel movement today and no blood was noted. Will continue to monitor for changes.

## 2020-09-13 NOTE — PROGRESS NOTES
9:01 Mehrdad called the patients mother back as I told her I would and explained that they charge nurse would need to contact the director but if the unit was not more settled they would not be able to visit. Patients mother stated she was not okay with that and that she feels we are sacrificing there daughters health over other patients. She requested that I contact the Director or that persons supervisor because she is not ok with not being able to see her daugther after she was told by the doctor that she could. 9:12  The director called me and stated to refer the patients family back to the unit.

## 2020-09-13 NOTE — PROGRESS NOTES
I accessed this patients chart to do a [address] Screening audit and a CSS[facility] Frequency screen audit.

## 2020-09-13 NOTE — BH NOTES
Assumed care of the patient. Patient was visible in the unit. She was observed interacting  appropriately with the staffs and the peers. She appeared elated. Patient was cooperative with the assessments. She stated that she was doing good. She denied S.I/H. I/A/V/H and said that her anxiety level was super high. Patient stated, \" I feel, look and sound a lot more like myself. \" Patient complained of stomachache, insignificant blood loss in stool and added that she had BM 3 times today. Informed patient to let me know if she has another episode like that, in the mean time will continue to monitor. Patient was medicine and meal compliant. She received PRN PO Trazodone for sleep at 2210. Patient slept for about 5.5 hours.

## 2020-09-14 VITALS
DIASTOLIC BLOOD PRESSURE: 64 MMHG | OXYGEN SATURATION: 100 % | HEIGHT: 65 IN | RESPIRATION RATE: 18 BRPM | TEMPERATURE: 98.4 F | BODY MASS INDEX: 18.18 KG/M2 | HEART RATE: 75 BPM | SYSTOLIC BLOOD PRESSURE: 103 MMHG | WEIGHT: 109.1 LBS

## 2020-09-14 LAB
ALBUMIN SERPL-MCNC: 4.4 G/DL (ref 3.5–5)
ALBUMIN/GLOB SERPL: 1.3 {RATIO} (ref 1.1–2.2)
ALP SERPL-CCNC: 37 U/L (ref 45–117)
ALT SERPL-CCNC: 20 U/L (ref 12–78)
ANION GAP SERPL CALC-SCNC: 9 MMOL/L (ref 5–15)
AST SERPL-CCNC: 19 U/L (ref 15–37)
BILIRUB SERPL-MCNC: 0.4 MG/DL (ref 0.2–1)
BUN SERPL-MCNC: 13 MG/DL (ref 6–20)
BUN/CREAT SERPL: 13 (ref 12–20)
CALCIUM SERPL-MCNC: 9.9 MG/DL (ref 8.5–10.1)
CHLORIDE SERPL-SCNC: 103 MMOL/L (ref 97–108)
CO2 SERPL-SCNC: 28 MMOL/L (ref 21–32)
CREAT SERPL-MCNC: 0.97 MG/DL (ref 0.55–1.02)
DATE LAST DOSE: NORMAL
GLOBULIN SER CALC-MCNC: 3.4 G/DL (ref 2–4)
GLUCOSE SERPL-MCNC: 82 MG/DL (ref 65–100)
LITHIUM SERPL-SCNC: 0.84 MMOL/L (ref 0.6–1.2)
POTASSIUM SERPL-SCNC: 4.1 MMOL/L (ref 3.5–5.1)
PROT SERPL-MCNC: 7.8 G/DL (ref 6.4–8.2)
REPORTED DOSE,DOSE: NORMAL UNITS
REPORTED DOSE/TIME,TMG: NORMAL
SODIUM SERPL-SCNC: 140 MMOL/L (ref 136–145)

## 2020-09-14 PROCEDURE — 74011250637 HC RX REV CODE- 250/637: Performed by: PSYCHIATRY & NEUROLOGY

## 2020-09-14 PROCEDURE — 99239 HOSP IP/OBS DSCHRG MGMT >30: CPT | Performed by: PSYCHIATRY & NEUROLOGY

## 2020-09-14 PROCEDURE — 74011250637 HC RX REV CODE- 250/637: Performed by: NURSE PRACTITIONER

## 2020-09-14 PROCEDURE — 36415 COLL VENOUS BLD VENIPUNCTURE: CPT

## 2020-09-14 PROCEDURE — 80053 COMPREHEN METABOLIC PANEL: CPT

## 2020-09-14 PROCEDURE — 80178 ASSAY OF LITHIUM: CPT

## 2020-09-14 RX ORDER — LITHIUM CARBONATE 300 MG/1
300 TABLET, FILM COATED, EXTENDED RELEASE ORAL EVERY 12 HOURS
Qty: 60 TAB | Refills: 1 | Status: SHIPPED | OUTPATIENT
Start: 2020-09-14

## 2020-09-14 RX ORDER — HYDROXYZINE 50 MG/1
50 TABLET, FILM COATED ORAL
Qty: 60 TAB | Refills: 1 | Status: SHIPPED | OUTPATIENT
Start: 2020-09-14 | End: 2020-11-13

## 2020-09-14 RX ADMIN — LITHIUM CARBONATE 300 MG: 300 TABLET, FILM COATED, EXTENDED RELEASE ORAL at 09:06

## 2020-09-14 RX ADMIN — HYDROXYZINE HYDROCHLORIDE 25 MG: 25 TABLET ORAL at 09:07

## 2020-09-14 NOTE — PROGRESS NOTES
Laboratory Monitoring for Lithium    This patient is currently prescribed the following medication(s):   Current Facility-Administered Medications   Medication Dose Route Frequency    lithium carbonate SR (LITHOBID) tablet 300 mg  300 mg Oral Q12H       The following labs have been completed for monitoring of lithium:    Lithium Serum Concentration  Lab Results   Component Value Date/Time    Lithium level 0.84 09/14/2020 06:12 AM         Renal Function and Chemistry  Lab Results   Component Value Date/Time    Sodium 140 09/14/2020 06:12 AM    Potassium 4.1 09/14/2020 06:12 AM    Chloride 103 09/14/2020 06:12 AM    CO2 28 09/14/2020 06:12 AM    Anion gap 9 09/14/2020 06:12 AM    BUN 13 09/14/2020 06:12 AM    Creatinine 0.97 09/14/2020 06:12 AM    BUN/Creatinine ratio 13 09/14/2020 06:12 AM       Assessment/Plan:  Lithium level drawn on 9/14/20 at 0612 AM is therapeutic at 0.84 mmol/L. Level was drawn approximately 8 hours post-dose and reflects steady-state concentration. Recommend to continue the same dose.          Gemini Cevallos, PharmD Contact: [phone]

## 2020-09-14 NOTE — DISCHARGE SUMMARY
PSYCHIATRIC DISCHARGE SUMMARY         IDENTIFICATION:    Patient Name  Nadira Aguilar   Date of Birth 1995   [facility] [id number]   Medical Record Number  [id number]      Age  26 y.o. PCP Jamari Benites MD   Admit date:  9/9/2020    Discharge date: 9/14/2020   Room Number  319/01  @ [facility]   Date of Service  9/14/2020            TYPE OF DISCHARGE: REGULAR               CONDITION AT DISCHARGE: improved and stable       PROVISIONAL & DISCHARGE DIAGNOSES:    Problem List  Date Reviewed: 2/21/2020          Codes Class    * (Principal) Bipolar I disorder, most recent episode mixed, severe without psychotic features ([facility] [address] ICD-10-CM: F31.63  ICD-9-CM: 296.63         Underweight due to inadequate caloric intake ICD-10-CM: R63.6  ICD-9-CM: 783.22         Menorrhagia ICD-10-CM: N92.0  ICD-9-CM: 626.2               Active Hospital Problems    *Bipolar I disorder, most recent episode mixed, severe without psychotic features [facility]      Underweight due to inadequate caloric intake      Menorrhagia        DISCHARGE DIAGNOSIS:   Axis I:  SEE ABOVE  Axis II: SEE ABOVE  Axis III: SEE ABOVE  Axis IV:  lack of structure  Axis V:  30 on admission, 70 on discharge     CC & HISTORY OF PRESENT ILLNESS:  \"suicidal ideation\"    The patient, Nelsy Savage, is a 21 y. o.  WHITE [place]  female with a past psychiatric history significant for generalized anxiety disorder and potential bipolar disorder, who presents at this time with complaints of (and/or evidence of) the following emotional symptoms: depression and suicidal thoughts/threats.  Additional symptomatology include increased interpersonal stressors.  The above symptoms have been present for 2+ weeks. These symptoms are of moderate to high severity. These symptoms are constant in nature.  The patient's condition has been precipitated by psychosocial stressors.  Patient's condition made worse by continued illicit drug use as well as treatment noncompliance. UDS: +THC; BAL=0.      Per admission paperwork, patient has been more distressed due to various familial stressors, including infidelity and recently failing an exam for her job. She describes SI, poor concentration, risky behavior and increased marijuana. The patient is a fair historian. The patient corroborates the above narrative. The patient contracts for safety on the unit and gives consent for the team to contact collateral. The patient is amenable to initiating treatment while on the unit. After a discussion of risk and benefit, patient agrees with starting lithium.     09/11 - the patient reports ongoing GI issues, stating that she had both diarrhea and vomiting this morning. She denies SI/HI/AVH/PI. She continues to be emotionally labile, tearful and confused about her current mental state but cooperative with assessments; she has many questions she wrote down but did not bring this into the session. Patient amenable to continuing Li++ trial. Psychoeducation provided regarding cannabis use, she agrees to abstinence upon discharge. Discussed visitation, mom and  will be allowed to visit by appointment. SOCIAL HISTORY:    Social History     Socioeconomic History    Marital status: SINGLE     Spouse name: Not on file    Number of children: Not on file    Years of education: Not on file    Highest education level: Not on file   Occupational History    Occupation: college student   Social Needs    Financial resource strain: Not on file    Food insecurity     Worry: Not on file     Inability: Not on file   [employer] needs     Medical: Not on file     Non-medical: Not on file   Tobacco Use    Smoking status: Never Smoker    Smokeless tobacco: Never Used   Substance and Sexual Activity    Alcohol use:  Yes     Alcohol/week: 0.0 standard drinks     Comment: occasionally    Drug use: No    Sexual activity: Yes     Partners: Male     Birth control/protection: Pill   Lifestyle    Physical activity     Days per week: Not on file     Minutes per session: Not on file    Stress: Not on file   Relationships    Social connections     Talks on phone: Not on file     Gets together: Not on file     Attends Evangelical service: Not on file     Active member of club or organization: Not on file     Attends meetings of clubs or organizations: Not on file     Relationship status: Not on file    Intimate partner violence     Fear of current or ex partner: Not on file     Emotionally abused: Not on file     Physically abused: Not on file     Forced sexual activity: Not on file   Other Topics Concern    Not on file   Social History Narrative    Not on file      FAMILY HISTORY:   No family history on file. HOSPITALIZATION COURSE:    Kang Pollard was admitted to the inpatient psychiatric unit [facility] for acute psychiatric stabilization in regards to symptomatology as described in the HPI above. The differential diagnosis at time of admission included: bipolar vs ALENA. While on the unit Kang Pollard was involved in individual, group, occupational and milieu therapy. Psychiatric medications were adjusted during this hospitalization including lithium. Kang Pollard demonstrated a slow, but progressive improvement in overall condition. Much of patient's initial presentation appeared to be related to situational stressors, drugs of abuse, and psychological factors. Please see individual progress notes for more specific details regarding patient's hospitalization course. Patient started on lithium, level therapeutic at 0.84 on the day of discharge. Patient appeared elevated for much of the admission but she remained calm and cooperative, labile and frequently tearful but open to continuing treatment upon discharge. At time of discharge, Kang Pollard is without significant problems of depression, psychosis, or bernarda.  Patient free of suicidal and homicidal ideations (appears to be at very low risk of suicide or homicide) and reports many positive predictive factors in terms of not attempting suicide or homicide. Overall presentation at time of discharge is most consistent with the diagnosis of bipolar disorder mixed episode. Patient has maximized benefit to be derived from acute inpatient psychiatric treatment. All members of the treatment team concur with each other in regards to plans for discharge today. Patient and family are aware and in agreement with discharge and discharge plan. LABS AND IMAGAING:    Labs Reviewed   IRON PROFILE - Abnormal; Notable for the following components:       Result Value    Iron % saturation 15 (*)     All other components within normal limits   METABOLIC PANEL, COMPREHENSIVE - Abnormal; Notable for the following components:    Alk.  phosphatase 37 (*)     All other components within normal limits   LIPID PANEL   HEMOGLOBIN A1C WITH EAG   TSH 3RD GENERATION   VITAMIN B12   FOLATE   MAGNESIUM   LITHIUM     No results found for: DS35, PHEN, PHENO, PHENT, DILF, DS39, PHENY, PTN, VALF2, VALAC, VALP, VALPR, DS6, CRBAM, CRBAMP, CARB2, XCRBAM  Admission on 09/09/2020, Discharged on 09/14/2020   Component Date Value Ref Range Status    LIPID PROFILE 09/11/2020        Final    Cholesterol, total 09/11/2020 172  <200 MG/DL Final    Triglyceride 09/11/2020 86  <150 MG/DL Final    HDL Cholesterol 09/11/2020 62  MG/DL Final    LDL, calculated 09/11/2020 92.8  0 - 100 MG/DL Final    VLDL, calculated 09/11/2020 17.2  MG/DL Final    CHOL/HDL Ratio 09/11/2020 2.8  0.0 - 5.0   Final    Hemoglobin A1c 09/11/2020 4.8  4.0 - 5.6 % Final    Est. average glucose 09/11/2020 91  mg/dL Final    TSH 09/11/2020 1.15  0.36 - 3.74 uIU/mL Final    Vitamin B12 09/11/2020 464  193 - 986 pg/mL Final    Iron 09/11/2020 44  35 - 150 ug/dL Final    TIBC 09/11/2020 290  250 - 450 ug/dL Final    Iron % saturation 09/11/2020 15* 20 - 50 % Final    Folate 09/11/2020 19.7  5.0 - 21.0 ng/mL Final    Magnesium 09/11/2020 2.1  1.6 - 2.4 mg/dL Final    Lithium level 09/14/2020 0.84  0.60 - 1.20 MMOL/L Final    Reported dose date 09/14/2020 NOT PROVIDED    Final    Reported dose time: 09/14/2020 NOT PROVIDED    Final    Reported dose: 09/14/2020 300 MG BID  UNITS Final    Sodium 09/14/2020 140  136 - 145 mmol/L Final    Potassium 09/14/2020 4.1  3.5 - 5.1 mmol/L Final    Chloride 09/14/2020 103  97 - 108 mmol/L Final    CO2 09/14/2020 28  21 - 32 mmol/L Final    Anion gap 09/14/2020 9  5 - 15 mmol/L Final    Glucose 09/14/2020 82  65 - 100 mg/dL Final    BUN 09/14/2020 13  6 - 20 MG/DL Final    Creatinine 09/14/2020 0.97  0.55 - 1.02 MG/DL Final    BUN/Creatinine ratio 09/14/2020 13  12 - 20   Final    GFR est AA 09/14/2020 >60  >60 ml/min/1.73m2 Final    GFR est non-AA 09/14/2020 >60  >60 ml/min/1.73m2 Final    Calcium 09/14/2020 9.9  8.5 - 10.1 MG/DL Final    Bilirubin, total 09/14/2020 0.4  0.2 - 1.0 MG/DL Final    ALT (SGPT) 09/14/2020 20  12 - 78 U/L Final    AST (SGOT) 09/14/2020 19  15 - 37 U/L Final    Alk.  phosphatase 09/14/2020 37* 45 - 117 U/L Final    Protein, total 09/14/2020 7.8  6.4 - 8.2 g/dL Final    Albumin 09/14/2020 4.4  3.5 - 5.0 g/dL Final    Globulin 09/14/2020 3.4  2.0 - 4.0 g/dL Final    A-G Ratio 09/14/2020 1.3  1.1 - 2.2   Final   Admission on 09/08/2020, Discharged on 09/09/2020   Component Date Value Ref Range Status    WBC 09/08/2020 10.1  3.6 - 11.0 K/uL Final    RBC 09/08/2020 4.47  3.80 - 5.20 M/uL Final    HGB 09/08/2020 14.4  11.5 - 16.0 g/dL Final    HCT 09/08/2020 42.5  35.0 - 47.0 % Final    MCV 09/08/2020 95.1  80.0 - 99.0 FL Final    MCH 09/08/2020 32.2  26.0 - 34.0 PG Final    MCHC 09/08/2020 33.9  30.0 - 36.5 g/dL Final    RDW 09/08/2020 11.5  11.5 - 14.5 % Final    PLATELET 64/53/3882 292  150 - 400 K/uL Final    MPV 09/08/2020 10.1  8.9 - 12.9 FL Final    NRBC 09/08/2020 0.0  0  WBC Final    ABSOLUTE NRBC 09/08/2020 0.00  0.00 - 0.01 K/uL Final    NEUTROPHILS 09/08/2020 81* 32 - 75 % Final    LYMPHOCYTES 09/08/2020 15  12 - 49 % Final    MONOCYTES 09/08/2020 4* 5 - 13 % Final    EOSINOPHILS 09/08/2020 0  0 - 7 % Final    BASOPHILS 09/08/2020 0  0 - 1 % Final    IMMATURE GRANULOCYTES 09/08/2020 0  0.0 - 0.5 % Final    ABS. NEUTROPHILS 09/08/2020 8.1* 1.8 - 8.0 K/UL Final    ABS. LYMPHOCYTES 09/08/2020 1.5  0.8 - 3.5 K/UL Final    ABS. MONOCYTES 09/08/2020 0.4  0.0 - 1.0 K/UL Final    ABS. EOSINOPHILS 09/08/2020 0.0  0.0 - 0.4 K/UL Final    ABS. BASOPHILS 09/08/2020 0.0  0.0 - 0.1 K/UL Final    ABS. IMM. GRANS. 09/08/2020 0.0  0.00 - 0.04 K/UL Final    DF 09/08/2020 AUTOMATED    Final    Sodium 09/08/2020 139  136 - 145 mmol/L Final    Potassium 09/08/2020 3.6  3.5 - 5.1 mmol/L Final    Chloride 09/08/2020 106  97 - 108 mmol/L Final    CO2 09/08/2020 27  21 - 32 mmol/L Final    Anion gap 09/08/2020 6  5 - 15 mmol/L Final    Glucose 09/08/2020 102* 65 - 100 mg/dL Final    BUN 09/08/2020 10  6 - 20 MG/DL Final    Creatinine 09/08/2020 0.82  0.55 - 1.02 MG/DL Final    BUN/Creatinine ratio 09/08/2020 12  12 - 20   Final    GFR est AA 09/08/2020 >60  >60 ml/min/1.73m2 Final    GFR est non-AA 09/08/2020 >60  >60 ml/min/1.73m2 Final    Calcium 09/08/2020 9.9  8.5 - 10.1 MG/DL Final    Bilirubin, total 09/08/2020 0.6  0.2 - 1.0 MG/DL Final    ALT (SGPT) 09/08/2020 20  12 - 78 U/L Final    AST (SGOT) 09/08/2020 18  15 - 37 U/L Final    Alk.  phosphatase 09/08/2020 51  45 - 117 U/L Final    Protein, total 09/08/2020 8.6* 6.4 - 8.2 g/dL Final    Albumin 09/08/2020 5.0  3.5 - 5.0 g/dL Final    Globulin 09/08/2020 3.6  2.0 - 4.0 g/dL Final    A-G Ratio 09/08/2020 1.4  1.1 - 2.2   Final    ALCOHOL(ETHYL),SERUM 09/08/2020 <10  <10 MG/DL Final    Salicylate level 49/69/7000 <1.7* 2.8 - 20.0 MG/DL Final    Acetaminophen level 09/08/2020 <2* 10 - 30 ug/mL Final    Color 09/08/2020 YELLOW/STRAW Final    Appearance 09/08/2020 CLEAR  CLEAR   Final    Specific gravity 09/08/2020 1.014  1.003 - 1.030   Final    pH (UA) 09/08/2020 6.0  5.0 - 8.0   Final    Protein 09/08/2020 Negative  NEG mg/dL Final    Glucose 09/08/2020 Negative  NEG mg/dL Final    Ketone 09/08/2020 40* NEG mg/dL Final    Bilirubin 09/08/2020 Negative  NEG   Final    Blood 09/08/2020 Negative  NEG   Final    Urobilinogen 09/08/2020 0.2  0.2 - 1.0 EU/dL Final    Nitrites 09/08/2020 Negative  NEG   Final    Leukocyte Esterase 09/08/2020 Negative  NEG   Final    AMPHETAMINES 09/08/2020 Negative  NEG   Final    BARBITURATES 09/08/2020 Negative  NEG   Final    BENZODIAZEPINES 09/08/2020 Negative  NEG   Final    COCAINE 09/08/2020 Negative  NEG   Final    METHADONE 09/08/2020 Negative  NEG   Final    OPIATES 09/08/2020 Negative  NEG   Final    PCP(PHENCYCLIDINE) 09/08/2020 Negative  NEG   Final    THC (TH-CANNABINOL) 09/08/2020 Positive* NEG   Final    Drug screen comment 09/08/2020 (NOTE)   Final    SAMPLES BEING HELD 09/08/2020 SST.GRN. JANEL   Final    COMMENT 09/08/2020 Add-on orders for these samples will be processed based on acceptable specimen integrity and analyte stability, which may vary by analyte. Final    Specimen source 09/08/2020 Nasopharyngeal    Final    Specimen source 09/08/2020 Nasopharyngeal    Final    COVID-19 rapid test 09/08/2020 Not detected  NOTD   Final    Specimen type 09/08/2020 NP Swab    Final    Health status 09/08/2020 Symptomatic Testing    Final    Urine culture hold 09/08/2020 Urine on hold in Microbiology dept for 2 days. If unpreserved urine is submitted, it cannot be used for addtional testing after 24 hours, recollection will be required. Final    Pregnancy test,urine (POC) 09/09/2020 Negative  NEG   Final     No results found. DISPOSITION:    Home. Patient to f/u with psychiatric and psychotherapy appointments. Patient is to f/u with internist as directed. Patient should have a lithium level and associated labs checked within the next 6 months by patient's o/p psychiatrist/internist.               FOLLOW-UP CARE:    Activity as tolerated  Regular diet  Wound Care: none needed. Follow-up Information     Follow up With Specialties Details Why Contact Info    Dr. Dianna Cruz  Call on 9/23/2020 Medication management intake appointment on Wednesday, March 23rd at 1:00PM by telehealth. [facility]  Address: [place] [address] [address] [id number], [address]  Phone: [phone]  Fax: [phone]    Ilan Champion  Call on 9/22/2020 Therapy appointment on Tuesday, September 22nd at 10:00AM.  [facility]  Address: [place] [address] [address] [id number], [address]  Phone: [phone]  Fax: [phone]                 PROGNOSIS:   Mollie Innocent ---- based on nature of patient's pathology/ies and treatment compliance issues. Prognosis is greatly dependent upon patient's ability to remain sober and to follow up with scheduled appointments as well as to comply with psychiatric medications as prescribed. DISCHARGE MEDICATIONS:  Informed consent given for the use of following psychotropic medications:  Discharge Medication List as of 9/14/2020  2:37 PM      START taking these medications    Details   hydrOXYzine HCL (ATARAX) 50 mg tablet Take 1 Tab by mouth two (2) times daily as needed for Anxiety for up to 60 days. Indications: Panic symptoms, Normal, Disp-60 Tab,R-1      lithium carbonate SR (LITHOBID) 300 mg CR tablet Take 1 Tab by mouth every twelve (12) hours. Indications: bipolar disorder, Normal, Disp-60 Tab,R-1         CONTINUE these medications which have NOT CHANGED    Details   albuterol (PROVENTIL HFA, VENTOLIN HFA, PROAIR HFA) 90 mcg/actuation inhaler Take 1 Puff by inhalation every six (6) hours as needed for Wheezing or Shortness of Breath., Historical Med      levonorgestrel (KYLEENA IU) by IntraUTERine route. Every 5 years - placed in February 2020, Historical Med                    A coordinated, multidisplinary treatment team round was conducted with Nelsy Gonsales---this is done daily here at [facility]. This team consists of the nurse, psychiatric unit pharmacist,  and writer. I have spent greater than 35 minutes on discharge work.     Signed:  Phoebe Jaime MD  9/14/2020

## 2020-09-14 NOTE — BH NOTES
Discharge note    Patient alert and verbal. Discharged to  to continue recommended plan of care. Discharge instructions reviewed with patient. Patient verbalized understanding of e-scripts and follow-up appointments. Patients belongings returned and signed for. Patient transported to home via husbands car.

## 2020-09-14 NOTE — BH NOTES
Assumed nursing care of Nelsy at 2045. Nelsy presented with a clean appearance. She was in the dayroom area until going to bed close to 2300. She was interactive with her peers and cooperative during the goup session. Pt was talkative, laughing and smiling easily and often. During the initial nursing assessment she denied all problems including SI, HI and all s/s of psychosis such as AV hallucinations. Pt stated she is having to face the realization of having Bipolar D/O, stated she is beginning to learn about it, stated her family has printed off articles for her to read about it, stated her  has knowledge of it since he has a relative with Bipolar also. Pt is focused on going home and feels she may do so on Monday. Instructed on the importance of med compliance, on follow-up per doctor's instructions and on reporting changes to her MD.  She verbalized understanding. Encouraging participation with treatment and monitoring for mood chgs, behavior and for safety. Addendum:  Pt received Atarax 50mg po at 8166 for anxiety. Addendum at 0615:  Nelsy has slept around 5.5 hrs. Her lab was drawn x 2 attempts by writer. Pt tolerated this well. She is resting quietly in bed at this time.

## 2020-09-14 NOTE — GROUP NOTE
[facility] GROUP DOCUMENTATION INDIVIDUAL Group Therapy Note Date: 9/13/2020 Group Start Time: 2000 Group End Time: 2130 Group Topic: Reflection/Relaxation [facility] - [address] Eli aMrks RN 
 
[facility] GROUP DOCUMENTATION GROUP Group Therapy Note Attendees: There were 6 people that attended the group therapy Attendance: Attended Patient's Goal:  Focus on goals for the future and reflection of the day. Relaxation techniques with chair exercises, Aromatherapy and deep breathing exercises. Interventions/techniques: Promoted peer support, Provide feedback, Reinforced and Supported Follows Directions: Followed directions Interactions: Interacted appropriately Mental Status: Calm and Happy Behavior/appearance: Attentive and Cooperative Goals Achieved: Able to engage in interactions, Able to listen to others, Able to reflect/comment on own behavior, Able to manage/cope with feelings, Discussed coping and Discussed discharge plans Additional Notes:  Patient plans on helping sister with her wedding and looking forward to seeing her sister,mother and dog.  
 
Ludy aLguna RN

## 2020-09-14 NOTE — PROGRESS NOTES
Problem: Suicide  Goal: *STG: Remains safe in hospital  Outcome: Progressing Towards Goal  Goal: *STG: Seeks staff when feelings of self harm or harm towards others arise  Outcome: Progressing Towards Goal  Goal: *STG: Attends activities and groups  Outcome: Progressing Towards Goal     Problem: Anxiety-Behavioral Health (Adult/Pediatric)  Goal: *STG: Participates in treatment plan  Outcome: Progressing Towards Goal  Goal: *STG: Remains safe in hospital  Outcome: Progressing Towards Goal  Goal: *STG: Seeks staff when feelings of anxiety and fear arise  Outcome: Progressing Towards Goal  Goal: *STG: Attends activities and groups  Outcome: Progressing Towards Goal  Goal: *STG: Demonstrates decrease in ritualistic behavior  Outcome: Progressing Towards Goal  Goal: *STG: Demonstrates effective anxiety reduction strategies  Outcome: Progressing Towards Goal  Goal: *STG/LTG: Trigger identification  Outcome: Progressing Towards Goal

## 2020-09-14 NOTE — GROUP NOTE
[facility]  GROUP DOCUMENTATION INDIVIDUAL Group Therapy Note Date: 9/14/2020 Group Start Time: 1000 Group End Time: 1100 Group Topic: Topic Group [facility] [place] 3 ACUTE BEHAV [facility] [place], [address] GROUP DOCUMENTATION GROUP Group Therapy Note Attendees: 7 Attendance: Attended Patient's Goal:  To participate in mental health journey game Interventions/techniques: Supported-choices in recovery Follows Directions: Followed directions Interactions: Interacted appropriately Mental Status: Calm Behavior/appearance: Attentive and Cooperative Goals Achieved: Able to engage in interactions, Able to listen to others, Able to self-disclose and Discussed coping Additional Notes:  Pleasant-offered relevant feedback during discussion Ovidio Dennison

## 2020-09-14 NOTE — DISCHARGE INSTRUCTIONS
Patient Education      If I feel I am at risk of hurting myself or others, I will call the crisis office and speak with a crisis worker who will assist me during my crisis. [address]  [phone]  [facility] [phone]  [address] Crisis-  [phone]  [place]-  [phone]  [place] [facility]-  [phone]      Bipolar Disorder: Care Instructions  Your Care Instructions     Bipolar disorder is an illness that causes extreme mood changes, from times of very high energy (manic episodes) to times of depression. But many people with bipolar disorder show only the symptoms of depression. These moods may cause problems with your work, school, family life, friendships, and how well you function. This disease is also called manic-depression. There is no cure for bipolar disorder, but it can be helped with medicines. Counseling may also help. It is important to take your medicines exactly as prescribed, even when you feel well. You may need lifelong treatment. Follow-up care is a key part of your treatment and safety. Be sure to make and go to all appointments, and call your doctor if you are having problems. It's also a good idea to know your test results and keep a list of the medicines you take. How can you care for yourself at home? · Be safe with medicines. Take your medicines exactly as prescribed. Do not stop or change a medicine without talking to your doctor first. Karen Serra and your doctor may need to try different combinations of medicines to find what works for you. · Take your medicines on schedule to keep your moods even. When you feel good, you may think that you do not need your medicines. But it is important to keep taking them. · Go to your counseling sessions. Call and talk with your counselor if you can't go to a session or if you don't think the sessions are helping. Do not just stop going.   · Get at least 27 minutes of activity on most days of the week. Walking is a good choice. You also may want to do other things, such as running, swimming, or cycling. · Get enough sleep. Keep your room dark and quiet. Try to go to bed at the same time every night. · Eat a healthy diet. This includes whole grains, dairy, fruits, vegetables, and protein. Eat foods from each of these groups. · Try to lower your stress. Manage your time, build a strong support system, and lead a healthy lifestyle. To lower your stress, try physical activity, slow deep breathing, or getting a massage. · Do not use alcohol, marijuana, or illegal drugs. · Learn the early signs of your mood changes. You can then take steps to help yourself feel better. · Ask for help from friends and family when you need it. You may need help with daily chores when you are depressed. When you are manic, you may need support to control your high energy levels. What should you do if someone in your family has bipolar disorder? · Learn about the disease and signs it's getting worse. · Remind your family member you love them. · Make a plan with all family members about how to take care of your loved one when symptoms are bad. · Remind yourself it will take time for changes to occur. · Try not to blame yourself for the disease. · Know your legal rights and the legal rights of your family member. Support groups or counselors can help with this information. · Take care of yourself. Keep up with your interests, such as career, hobbies, and friends. Use exercise, positive self-talk, deep breathing, and other relaxing exercises to help lower your stress. · Give yourself time to grieve. You may need to deal with emotions such as anger, fear, and frustration. · If you are having a hard time with your feelings or with your relationship with your family member, talk with a counselor. When should you call for help? Call 911 anytime you think you may need emergency care. For example, call if:    · You feel like hurting yourself or someone else.     · Someone who has bipolar disorder displays dangerous behavior, and you think the person might hurt himself or herself or someone else. Call your doctor now or seek immediate medical care if:    · You hear voices.     · Someone you know has bipolar disorder and talks about suicide. Keep the numbers for these national suicide hotlines: 4-305-731-TALK (9-836.389.8128) and 7-797-YGUUSZN (8-376.418.4156). If a suicide threat seems real, with a specific plan and a way to carry it out, stay with the person, or ask someone you trust to stay with the person, until you can get help.     · Someone you know has bipolar disorder and:  ? Starts to give away possessions. ? Is using illegal drugs or drinking alcohol heavily. ? Talks or writes about death, including writing suicide notes or talking about guns, knives, or pills. ? Talks or writes about hurting someone else. ? Starts to spend a lot of time alone. ? Acts very aggressively or suddenly appears calm. ? Talks about beliefs that are not based in reality (delusions). Watch closely for changes in your health, and be sure to contact your doctor if:    · You cannot go to your counseling sessions. Where can you learn more? Go to http://brooke-alli.info/  Enter K052 in the search box to learn more about \"Bipolar Disorder: Care Instructions. \"  Current as of: January 31, 2020               Content Version: 12.6  © 6657-7083 [employer]. Care instructions adapted under license by [employer] (which disclaims liability or warranty for this information). If you have questions about a medical condition or this instruction, always ask your healthcare professional. [address] any warranty or liability for your use of this information.

## 2020-09-14 NOTE — PROGRESS NOTES
Pharmacist Discharge Medication Reconciliation    Discharge Provider:  Homa 3        Discharge Medications:      My Medications        START taking these medications        Instructions Each Dose to Equal Morning Noon Evening Bedtime   hydrOXYzine HCL 50 mg tablet  Commonly known as:  ATARAX      Take 1 Tab by mouth two (2) times daily as needed for Anxiety for up to 60 days. Indications: Panic symptoms   50 mg         lithium carbonate  mg CR tablet  Commonly known as:  LITHOBID      Take 1 Tab by mouth every twelve (12) hours. Indications: bipolar disorder   300 mg                CONTINUE taking these medications        Instructions Each Dose to Equal Morning Noon Evening Bedtime   albuterol 90 mcg/actuation inhaler  Commonly known as:  PROVENTIL HFA, VENTOLIN HFA, PROAIR HFA      Take 1 Puff by inhalation every six (6) hours as needed for Wheezing or Shortness of Breath. 1 Puff         KYLEENA IU      by IntraUTERine route.  Every 5 years - placed in February 2020                    Where to Get Your Medications        These medications were sent to [facility]/pharmacy #[id number], [address]  [address] [facility]      Phone:  [phone]   hydrOXYzine HCL 50 mg tablet  lithium carbonate  mg CR tablet            The patient's chart, MAR, and AVS were reviewed by   GREGORIA Ayers,   Contact: [phone]

## 2020-09-14 NOTE — BH NOTES
0700- Verbal shift change report given to [facility]. (oncoming nurse) by Kay Hi. (offgoing nurse). Report included the following information SBAR, Kardex, MAR and Recent Results. [id number] Patient denies SI/HI/AVH. Patient reports no depression but endorses anxiety 3/10. Patient reports that anxiety is related to potential discharge. Patient is calm cooperative, and pleasant with writer. Patient exhibiting more control over emotional regulation as compared to admission. Patients insight is clearer and patient very eager for recovery. Patient reports sleeping alright last night, with nightmares but no sleep paralysis. Patient states that this is an improvement from previous conditions. Patient in day room tolerating breakfast without issue. Patient medication compliant. No adverse behaviors noted at this time. Will continue to monitor patient for safety. [id number] Patient attended groups and participated. Patient interacting appropriately with peers and exhibiting no signs of distress when among peers. Patient tolerated lunch without issue. Patient appears excited related to discharge. [id number] Patient sitting in day room watching television and talking with peers. No adverse behaviors noted at this time.

## 2020-09-14 NOTE — INTERDISCIPLINARY ROUNDS
[facility] Interdisciplinary Rounds Patient Name: Amos Riedel  Age: 26 y.o. Room/Bed:  319/01 Primary Diagnosis: Bipolar I disorder, most recent episode mixed, severe without psychotic features [facility] Admission Status: Voluntary Readmission within 30 days: no 
Power of  in place: no 
Patient requires a blocked bed: no           
Reason for blocked bed: n/a Order for blocked bed obtained: no    
 
Sleep hours: 5.5 Morning Labs completed per orders:  yes Participation in Care/Groups:  yes Medication Compliant?: Yes PRNS (last 24 hours): Antianxiety Restraints (last 24 hours):  no 
Substance Abuse:  Yes, pos THC on 9/8/20   
24 hour chart check complete:

## 2020-09-15 NOTE — BH NOTES
Behavioral Health Transition Record to Provider    Patient Name: Jarrett Freedman  YOB: 1995  Medical Record Number: [id number]  Date of Admission: 9/9/2020  Date of Discharge: 9/14/2020    Attending Provider: Krishna Velasquez MD  Discharging Provider: Krishna Velasquez MD  To contact this individual call [phone] and ask the  to page. If unavailable, ask to be transferred to [address] [place] [address] Provider on call. [facility] Provider will be available on call 24/7 and during holidays.     Primary Care Provider: Marva Lopez MD    Allergies   Allergen Reactions    Latex Hives       Reason for Admission: Adriana     Ad[facility] Diagnosis: [facility] [F31.9]    * No surgery found *    Results for orders placed or performed during the hospital encounter of 09/09/20   LIPID PANEL   Result Value Ref Range    LIPID PROFILE          Cholesterol, total 172 <200 MG/DL    Triglyceride 86 <150 MG/DL    HDL Cholesterol 62 MG/DL    LDL, calculated 92.8 0 - 100 MG/DL    VLDL, calculated 17.2 MG/DL    CHOL/HDL Ratio 2.8 0.0 - 5.0     HEMOGLOBIN A1C WITH EAG   Result Value Ref Range    Hemoglobin A1c 4.8 4.0 - 5.6 %    Est. average glucose 91 mg/dL   TSH 3RD GENERATION   Result Value Ref Range    TSH 1.15 0.36 - 3.74 uIU/mL   VITAMIN B12   Result Value Ref Range    Vitamin B12 464 193 - 986 pg/mL   IRON PROFILE   Result Value Ref Range    Iron 44 35 - 150 ug/dL    TIBC 290 250 - 450 ug/dL    Iron % saturation 15 (L) 20 - 50 %   FOLATE   Result Value Ref Range    Folate 19.7 5.0 - 21.0 ng/mL   MAGNESIUM   Result Value Ref Range    Magnesium 2.1 1.6 - 2.4 mg/dL   LITHIUM   Result Value Ref Range    Lithium level 0.84 0.60 - 1.20 MMOL/L    Reported dose date NOT PROVIDED      Reported dose time: NOT PROVIDED      Reported dose: 300 MG BID UNITS   METABOLIC PANEL, COMPREHENSIVE   Result Value Ref Range    Sodium 140 136 - 145 mmol/L    Potassium 4.1 3.5 - 5.1 mmol/L    Chloride 103 97 - 108 mmol/L    CO2 28 21 - 32 mmol/L    Anion gap 9 5 - 15 mmol/L    Glucose 82 65 - 100 mg/dL    BUN 13 6 - 20 MG/DL    Creatinine 0.97 0.55 - 1.02 MG/DL    BUN/Creatinine ratio 13 12 - 20      GFR est AA >60 >60 ml/min/1.73m2    GFR est non-AA >60 >60 ml/min/1.73m2    Calcium 9.9 8.5 - 10.1 MG/DL    Bilirubin, total 0.4 0.2 - 1.0 MG/DL    ALT (SGPT) 20 12 - 78 U/L    AST (SGOT) 19 15 - 37 U/L    Alk. phosphatase 37 (L) 45 - 117 U/L    Protein, total 7.8 6.4 - 8.2 g/dL    Albumin 4.4 3.5 - 5.0 g/dL    Globulin 3.4 2.0 - 4.0 g/dL    A-G Ratio 1.3 1.1 - 2.2         Immunizations administered during this encounter:   Immunization History   Administered Date(s) Administered    DTaP 1995, 1995, 1995, 07/22/1997, 08/10/2001    Hep A Vaccine 07/23/2008, 07/16/2009    Hep B Vaccine 1996, 1996, 1996    Hib 1996, 1996, 1996, 05/02/1997    MMR 05/02/1997, 08/10/2001    Meningococcal ACWY Vaccine 07/16/2012    Poliovirus vaccine 1996, 1996, 07/22/1997, 08/10/2001    TB Skin Test (PPD) Intradermal 07/25/2017    Tdap 08/10/2017    Varicella Virus Vaccine 01/10/1997, 07/23/2008       Screening for Metabolic Disorders for Patients on Antipsychotic Medications  (Data obtained from the EMR)    Estimated Body Mass Index  Estimated body mass index is 18.16 kg/m² as calculated from the following:    Height as of this encounter: 5' 5\" (1.651 m). Weight as of this encounter: 49.5 kg (109 lb 1.6 oz).      Vital Signs/Blood Pressure  Visit Vitals  /64   Pulse 75   Temp 98.4 °F (36.9 °C)   Resp 18   Ht 5' 5\" (1.651 m)   Wt 49.5 kg (109 lb 1.6 oz)   SpO2 100%   Breastfeeding No   BMI 18.16 kg/m²       Blood Glucose/Hemoglobin A1c  Lab Results   Component Value Date/Time    Glucose 82 09/14/2020 06:12 AM       Lab Results   Component Value Date/Time    Hemoglobin A1c 4.8 09/11/2020 05:37 AM        Lipid Panel  Lab Results   Component Value Date/Time Cholesterol, total 172 09/11/2020 05:37 AM    HDL Cholesterol 62 09/11/2020 05:37 AM    LDL, calculated 92.8 09/11/2020 05:37 AM    Triglyceride 86 09/11/2020 05:37 AM    CHOL/HDL Ratio 2.8 09/11/2020 05:37 AM        Discharge Diagnosis: Please refer to physician's discharge summary. Discharge Plan:   The patient Janet King exhibits the ability to control behavior in a less restrictive environment. Patient's level of functioning is improving. No assaultive/destructive behavior has been observed for the past 24 hours. No suicidal/homicidal threat or behavior has been observed for the past 24 hours. There is no evidence of serious medication side effects. Patient has not been in physical or protective restraints for at least the past 24 hours. Nelsy assisted in making her therapy and psychiatry appointments. Mother was updated with discharge plan and additional community resources for pt and family. Pt and mother educated in medication regimen and aware that lithium will need to be checked in 6 months. Discharge Medication List and Instructions:   Discharge Medication List as of 9/14/2020  2:37 PM      START taking these medications    Details   hydrOXYzine HCL (ATARAX) 50 mg tablet Take 1 Tab by mouth two (2) times daily as needed for Anxiety for up to 60 days. Indications: Panic symptoms, Normal, Disp-60 Tab,R-1      lithium carbonate SR (LITHOBID) 300 mg CR tablet Take 1 Tab by mouth every twelve (12) hours. Indications: bipolar disorder, Normal, Disp-60 Tab,R-1         CONTINUE these medications which have NOT CHANGED    Details   albuterol (PROVENTIL HFA, VENTOLIN HFA, PROAIR HFA) 90 mcg/actuation inhaler Take 1 Puff by inhalation every six (6) hours as needed for Wheezing or Shortness of Breath., Historical Med      levonorgestrel (KYLEENA IU) by IntraUTERine route.  Every 5 years - placed in February 2020, [address] (24h ago, onward)    None        To obtain results of studies pending at discharge, please contact N/A    Follow-up Information     Follow up With Specialties Details Why [facility]    Dr. Renee Luz  Call on 9/23/2020 Medication management intake appointment on Wednesday, March 23rd at 1:00PM by telehealth. [facility]  Address: [place] [address] [address], [address]  Phone: [phone]  Fax: [phone]    David Dunaway  Call on 9/22/2020 Therapy appointment on Tuesday, September 22nd at 10:00AM.  [facility]  Address: [facility] [address], [address]  Phone: [phone]  Fax: [phone]        Advanced Directive:   Does the patient have an appointed surrogate decision maker? Unknown   Does the patient have a Medical Advance Directive? Unknown   Does the patient have a Psychiatric Advance Directive? Unknown   If the patient does not have a surrogate or Medical Advance Directive AND Psychiatric Advance Directive, the patient was offered information on these advance directives. Unknown     Patient Instructions: Please continue all medications until otherwise directed by physician. Tobacco Cessation Discharge Plan:   Is the patient a smoker and needs referral for smoking cessation? No  Patient referred to the following for smoking cessation with an appointment? No   Patient was offered medication to assist with smoking cessation at discharge? No  Was education for smoking cessation added to the discharge instructions? No     Alcohol/Substance Abuse Discharge Plan:   Does the patient have a history of substance/alcohol abuse and requires a referral for treatment? Yes  Patient referred to the following for substance/alcohol abuse treatment with an appointment? Yes  Patient was offered medication to assist with alcohol cessation at discharge? No  Was education for substance/alcohol abuse added to discharge instructions?  Yes     Patient discharged to Home; provided to the patient/caregiver either in hard copy or electronically. Continuing care paperwork was faxed to community mental health providers.

## 2022-03-19 PROBLEM — R63.6 UNDERWEIGHT DUE TO INADEQUATE CALORIC INTAKE: Status: ACTIVE | Noted: 2020-09-10

## 2022-03-19 PROBLEM — F31.63 BIPOLAR I DISORDER, MOST RECENT EPISODE MIXED, SEVERE WITHOUT PSYCHOTIC FEATURES (HCC): Status: ACTIVE | Noted: 2020-09-10

## 2022-03-19 PROBLEM — N92.0 MENORRHAGIA: Status: ACTIVE | Noted: 2017-05-23

## 2023-05-14 RX ORDER — ALBUTEROL SULFATE 90 UG/1
1 AEROSOL, METERED RESPIRATORY (INHALATION) EVERY 6 HOURS PRN
COMMUNITY

## 2023-05-14 RX ORDER — LITHIUM CARBONATE 300 MG/1
TABLET, FILM COATED, EXTENDED RELEASE ORAL EVERY 12 HOURS
COMMUNITY
Start: 2020-09-14